# Patient Record
Sex: FEMALE | Race: ASIAN | NOT HISPANIC OR LATINO | Employment: FULL TIME | URBAN - METROPOLITAN AREA
[De-identification: names, ages, dates, MRNs, and addresses within clinical notes are randomized per-mention and may not be internally consistent; named-entity substitution may affect disease eponyms.]

---

## 2020-04-16 ENCOUNTER — APPOINTMENT (OUTPATIENT)
Dept: CARDIOLOGY | Facility: MEDICAL CENTER | Age: 45
DRG: 872 | End: 2020-04-16
Attending: HOSPITALIST
Payer: COMMERCIAL

## 2020-04-16 ENCOUNTER — APPOINTMENT (OUTPATIENT)
Dept: RADIOLOGY | Facility: MEDICAL CENTER | Age: 45
DRG: 872 | End: 2020-04-16
Attending: EMERGENCY MEDICINE
Payer: COMMERCIAL

## 2020-04-16 ENCOUNTER — HOSPITAL ENCOUNTER (INPATIENT)
Facility: MEDICAL CENTER | Age: 45
LOS: 3 days | DRG: 872 | End: 2020-04-20
Attending: EMERGENCY MEDICINE | Admitting: HOSPITALIST
Payer: COMMERCIAL

## 2020-04-16 DIAGNOSIS — L02.91 ABSCESS: ICD-10-CM

## 2020-04-16 DIAGNOSIS — I16.0 HYPERTENSIVE URGENCY: ICD-10-CM

## 2020-04-16 DIAGNOSIS — L73.2 HIDRADENITIS: ICD-10-CM

## 2020-04-16 DIAGNOSIS — Z20.822 SUSPECTED COVID-19 VIRUS INFECTION: ICD-10-CM

## 2020-04-16 PROBLEM — R09.89 ABNORMAL FINDING OF LUNG: Status: ACTIVE | Noted: 2020-04-16

## 2020-04-16 LAB
ALBUMIN SERPL BCP-MCNC: 4.5 G/DL (ref 3.2–4.9)
ALBUMIN/GLOB SERPL: 1.5 G/DL
ALP SERPL-CCNC: 83 U/L (ref 30–99)
ALT SERPL-CCNC: 13 U/L (ref 2–50)
ANION GAP SERPL CALC-SCNC: 12 MMOL/L (ref 7–16)
AST SERPL-CCNC: 12 U/L (ref 12–45)
BASOPHILS # BLD AUTO: 0.3 % (ref 0–1.8)
BASOPHILS # BLD: 0.04 K/UL (ref 0–0.12)
BILIRUB SERPL-MCNC: 0.5 MG/DL (ref 0.1–1.5)
BUN SERPL-MCNC: 13 MG/DL (ref 8–22)
CALCIUM SERPL-MCNC: 9.4 MG/DL (ref 8.5–10.5)
CHLORIDE SERPL-SCNC: 99 MMOL/L (ref 96–112)
CO2 SERPL-SCNC: 26 MMOL/L (ref 20–33)
COVID ORDER STATUS COVID19: NORMAL
CREAT SERPL-MCNC: 0.71 MG/DL (ref 0.5–1.4)
CRP SERPL HS-MCNC: 1 MG/DL (ref 0–0.75)
EOSINOPHIL # BLD AUTO: 0.22 K/UL (ref 0–0.51)
EOSINOPHIL NFR BLD: 1.5 % (ref 0–6.9)
ERYTHROCYTE [DISTWIDTH] IN BLOOD BY AUTOMATED COUNT: 38.6 FL (ref 35.9–50)
ERYTHROCYTE [SEDIMENTATION RATE] IN BLOOD BY WESTERGREN METHOD: 10 MM/HOUR (ref 0–20)
FERRITIN SERPL-MCNC: 97.9 NG/ML (ref 10–291)
FLUAV RNA SPEC QL NAA+PROBE: NEGATIVE
FLUBV RNA SPEC QL NAA+PROBE: NEGATIVE
GLOBULIN SER CALC-MCNC: 3.1 G/DL (ref 1.9–3.5)
GLUCOSE SERPL-MCNC: 112 MG/DL (ref 65–99)
GRAM STN SPEC: NORMAL
HCG SERPL QL: NEGATIVE
HCT VFR BLD AUTO: 45.6 % (ref 37–47)
HGB BLD-MCNC: 16 G/DL (ref 12–16)
IMM GRANULOCYTES # BLD AUTO: 0.06 K/UL (ref 0–0.11)
IMM GRANULOCYTES NFR BLD AUTO: 0.4 % (ref 0–0.9)
LACTATE BLD-SCNC: 2.2 MMOL/L (ref 0.5–2)
LACTATE BLD-SCNC: 2.2 MMOL/L (ref 0.5–2)
LACTATE BLD-SCNC: 3.1 MMOL/L (ref 0.5–2)
LDH SERPL L TO P-CCNC: 244 U/L (ref 107–266)
LV EJECT FRACT  99904: 60
LV EJECT FRACT MOD 2C 99903: 53.65
LV EJECT FRACT MOD 4C 99902: 52.16
LV EJECT FRACT MOD BP 99901: 51.69
LYMPHOCYTES # BLD AUTO: 2.14 K/UL (ref 1–4.8)
LYMPHOCYTES NFR BLD: 14.6 % (ref 22–41)
MAGNESIUM SERPL-MCNC: 1.8 MG/DL (ref 1.5–2.5)
MCH RBC QN AUTO: 29.2 PG (ref 27–33)
MCHC RBC AUTO-ENTMCNC: 35.1 G/DL (ref 33.6–35)
MCV RBC AUTO: 83.2 FL (ref 81.4–97.8)
MONOCYTES # BLD AUTO: 0.81 K/UL (ref 0–0.85)
MONOCYTES NFR BLD AUTO: 5.5 % (ref 0–13.4)
NEUTROPHILS # BLD AUTO: 11.39 K/UL (ref 2–7.15)
NEUTROPHILS NFR BLD: 77.7 % (ref 44–72)
NRBC # BLD AUTO: 0 K/UL
NRBC BLD-RTO: 0 /100 WBC
PHOSPHATE SERPL-MCNC: 2.1 MG/DL (ref 2.5–4.5)
PLATELET # BLD AUTO: 262 K/UL (ref 164–446)
PMV BLD AUTO: 9.9 FL (ref 9–12.9)
POTASSIUM SERPL-SCNC: 3.3 MMOL/L (ref 3.6–5.5)
PROCALCITONIN SERPL-MCNC: <0.05 NG/ML
PROT SERPL-MCNC: 7.6 G/DL (ref 6–8.2)
RBC # BLD AUTO: 5.48 M/UL (ref 4.2–5.4)
SARS-COV-2 RNA RESP QL NAA+PROBE: NEGATIVE
SIGNIFICANT IND 70042: NORMAL
SITE SITE: NORMAL
SODIUM SERPL-SCNC: 137 MMOL/L (ref 135–145)
SOURCE SOURCE: NORMAL
SPECIMEN SOURCE: NORMAL
TROPONIN T SERPL-MCNC: <6 NG/L (ref 6–19)
WBC # BLD AUTO: 14.7 K/UL (ref 4.8–10.8)

## 2020-04-16 PROCEDURE — 86140 C-REACTIVE PROTEIN: CPT

## 2020-04-16 PROCEDURE — 93306 TTE W/DOPPLER COMPLETE: CPT | Mod: 26 | Performed by: INTERNAL MEDICINE

## 2020-04-16 PROCEDURE — 87070 CULTURE OTHR SPECIMN AEROBIC: CPT

## 2020-04-16 PROCEDURE — 700105 HCHG RX REV CODE 258: Performed by: HOSPITALIST

## 2020-04-16 PROCEDURE — G0378 HOSPITAL OBSERVATION PER HR: HCPCS

## 2020-04-16 PROCEDURE — 93306 TTE W/DOPPLER COMPLETE: CPT

## 2020-04-16 PROCEDURE — 83615 LACTATE (LD) (LDH) ENZYME: CPT

## 2020-04-16 PROCEDURE — 700101 HCHG RX REV CODE 250: Performed by: EMERGENCY MEDICINE

## 2020-04-16 PROCEDURE — 87040 BLOOD CULTURE FOR BACTERIA: CPT

## 2020-04-16 PROCEDURE — 84484 ASSAY OF TROPONIN QUANT: CPT

## 2020-04-16 PROCEDURE — 82728 ASSAY OF FERRITIN: CPT

## 2020-04-16 PROCEDURE — 71045 X-RAY EXAM CHEST 1 VIEW: CPT

## 2020-04-16 PROCEDURE — 80053 COMPREHEN METABOLIC PANEL: CPT

## 2020-04-16 PROCEDURE — 700101 HCHG RX REV CODE 250

## 2020-04-16 PROCEDURE — 700102 HCHG RX REV CODE 250 W/ 637 OVERRIDE(OP): Performed by: HOSPITALIST

## 2020-04-16 PROCEDURE — A9270 NON-COVERED ITEM OR SERVICE: HCPCS | Performed by: HOSPITALIST

## 2020-04-16 PROCEDURE — 84145 PROCALCITONIN (PCT): CPT

## 2020-04-16 PROCEDURE — 83735 ASSAY OF MAGNESIUM: CPT

## 2020-04-16 PROCEDURE — 83605 ASSAY OF LACTIC ACID: CPT | Mod: 91

## 2020-04-16 PROCEDURE — 96375 TX/PRO/DX INJ NEW DRUG ADDON: CPT

## 2020-04-16 PROCEDURE — 96366 THER/PROPH/DIAG IV INF ADDON: CPT

## 2020-04-16 PROCEDURE — 99220 PR INITIAL OBSERVATION CARE,LEVL III: CPT | Performed by: HOSPITALIST

## 2020-04-16 PROCEDURE — 84100 ASSAY OF PHOSPHORUS: CPT

## 2020-04-16 PROCEDURE — 84703 CHORIONIC GONADOTROPIN ASSAY: CPT

## 2020-04-16 PROCEDURE — 700111 HCHG RX REV CODE 636 W/ 250 OVERRIDE (IP): Performed by: HOSPITALIST

## 2020-04-16 PROCEDURE — U0004 COV-19 TEST NON-CDC HGH THRU: HCPCS

## 2020-04-16 PROCEDURE — 303977 HCHG I & D

## 2020-04-16 PROCEDURE — G2023 SPECIMEN COLLECT COVID-19: HCPCS | Performed by: EMERGENCY MEDICINE

## 2020-04-16 PROCEDURE — 99285 EMERGENCY DEPT VISIT HI MDM: CPT

## 2020-04-16 PROCEDURE — 85025 COMPLETE CBC W/AUTO DIFF WBC: CPT

## 2020-04-16 PROCEDURE — 87502 INFLUENZA DNA AMP PROBE: CPT

## 2020-04-16 PROCEDURE — 0X940ZZ DRAINAGE OF RIGHT AXILLA, OPEN APPROACH: ICD-10-PCS | Performed by: EMERGENCY MEDICINE

## 2020-04-16 PROCEDURE — 96376 TX/PRO/DX INJ SAME DRUG ADON: CPT

## 2020-04-16 PROCEDURE — 96365 THER/PROPH/DIAG IV INF INIT: CPT

## 2020-04-16 PROCEDURE — 85652 RBC SED RATE AUTOMATED: CPT

## 2020-04-16 PROCEDURE — 700101 HCHG RX REV CODE 250: Performed by: HOSPITALIST

## 2020-04-16 PROCEDURE — 36415 COLL VENOUS BLD VENIPUNCTURE: CPT

## 2020-04-16 PROCEDURE — 87205 SMEAR GRAM STAIN: CPT

## 2020-04-16 PROCEDURE — 96367 TX/PROPH/DG ADDL SEQ IV INF: CPT

## 2020-04-16 PROCEDURE — 93005 ELECTROCARDIOGRAM TRACING: CPT | Performed by: EMERGENCY MEDICINE

## 2020-04-16 RX ORDER — PROCHLORPERAZINE EDISYLATE 5 MG/ML
5-10 INJECTION INTRAMUSCULAR; INTRAVENOUS EVERY 4 HOURS PRN
Status: DISCONTINUED | OUTPATIENT
Start: 2020-04-16 | End: 2020-04-19

## 2020-04-16 RX ORDER — DOXYCYCLINE 100 MG/1
100 TABLET ORAL EVERY 12 HOURS
Status: DISCONTINUED | OUTPATIENT
Start: 2020-04-16 | End: 2020-04-20 | Stop reason: HOSPADM

## 2020-04-16 RX ORDER — ENALAPRILAT 1.25 MG/ML
1.25 INJECTION INTRAVENOUS EVERY 6 HOURS PRN
Status: DISCONTINUED | OUTPATIENT
Start: 2020-04-16 | End: 2020-04-20 | Stop reason: HOSPADM

## 2020-04-16 RX ORDER — LIDOCAINE HYDROCHLORIDE AND EPINEPHRINE 10; 10 MG/ML; UG/ML
20 INJECTION, SOLUTION INFILTRATION; PERINEURAL ONCE
Status: COMPLETED | OUTPATIENT
Start: 2020-04-16 | End: 2020-04-16

## 2020-04-16 RX ORDER — AMOXICILLIN 250 MG
2 CAPSULE ORAL 2 TIMES DAILY
Status: DISCONTINUED | OUTPATIENT
Start: 2020-04-16 | End: 2020-04-19

## 2020-04-16 RX ORDER — BISACODYL 10 MG
10 SUPPOSITORY, RECTAL RECTAL
Status: DISCONTINUED | OUTPATIENT
Start: 2020-04-16 | End: 2020-04-19

## 2020-04-16 RX ORDER — PROMETHAZINE HYDROCHLORIDE 25 MG/1
12.5-25 SUPPOSITORY RECTAL EVERY 4 HOURS PRN
Status: DISCONTINUED | OUTPATIENT
Start: 2020-04-16 | End: 2020-04-19

## 2020-04-16 RX ORDER — POLYETHYLENE GLYCOL 3350 17 G/17G
1 POWDER, FOR SOLUTION ORAL
Status: DISCONTINUED | OUTPATIENT
Start: 2020-04-16 | End: 2020-04-19

## 2020-04-16 RX ORDER — FAMOTIDINE 20 MG/1
40 TABLET, FILM COATED ORAL DAILY
Status: DISCONTINUED | OUTPATIENT
Start: 2020-04-16 | End: 2020-04-19

## 2020-04-16 RX ORDER — AMLODIPINE BESYLATE 10 MG/1
10 TABLET ORAL
Status: DISCONTINUED | OUTPATIENT
Start: 2020-04-16 | End: 2020-04-20 | Stop reason: HOSPADM

## 2020-04-16 RX ORDER — HYDRALAZINE HYDROCHLORIDE 20 MG/ML
20 INJECTION INTRAMUSCULAR; INTRAVENOUS EVERY 6 HOURS PRN
Status: DISCONTINUED | OUTPATIENT
Start: 2020-04-16 | End: 2020-04-20 | Stop reason: HOSPADM

## 2020-04-16 RX ORDER — LABETALOL HYDROCHLORIDE 5 MG/ML
10 INJECTION, SOLUTION INTRAVENOUS ONCE
Status: COMPLETED | OUTPATIENT
Start: 2020-04-16 | End: 2020-04-16

## 2020-04-16 RX ORDER — LABETALOL HYDROCHLORIDE 5 MG/ML
10-20 INJECTION, SOLUTION INTRAVENOUS EVERY 4 HOURS PRN
Status: DISCONTINUED | OUTPATIENT
Start: 2020-04-16 | End: 2020-04-20 | Stop reason: HOSPADM

## 2020-04-16 RX ORDER — ONDANSETRON 2 MG/ML
4 INJECTION INTRAMUSCULAR; INTRAVENOUS ONCE
Status: DISPENSED | OUTPATIENT
Start: 2020-04-16 | End: 2020-04-17

## 2020-04-16 RX ORDER — MORPHINE SULFATE 4 MG/ML
4 INJECTION, SOLUTION INTRAMUSCULAR; INTRAVENOUS ONCE
Status: DISPENSED | OUTPATIENT
Start: 2020-04-16 | End: 2020-04-17

## 2020-04-16 RX ORDER — LIDOCAINE HYDROCHLORIDE AND EPINEPHRINE 10; 10 MG/ML; UG/ML
INJECTION, SOLUTION INFILTRATION; PERINEURAL
Status: COMPLETED
Start: 2020-04-16 | End: 2020-04-16

## 2020-04-16 RX ORDER — AMOXICILLIN AND CLAVULANATE POTASSIUM 500; 125 MG/1; MG/1
1 TABLET, FILM COATED ORAL EVERY 8 HOURS
Status: DISCONTINUED | OUTPATIENT
Start: 2020-04-16 | End: 2020-04-17

## 2020-04-16 RX ORDER — TRAMADOL HYDROCHLORIDE 50 MG/1
50 TABLET ORAL EVERY 6 HOURS PRN
Status: DISCONTINUED | OUTPATIENT
Start: 2020-04-16 | End: 2020-04-20 | Stop reason: HOSPADM

## 2020-04-16 RX ORDER — CLINDAMYCIN PHOSPHATE 600 MG/50ML
600 INJECTION, SOLUTION INTRAVENOUS ONCE
Status: COMPLETED | OUTPATIENT
Start: 2020-04-16 | End: 2020-04-16

## 2020-04-16 RX ORDER — ONDANSETRON 4 MG/1
4 TABLET, ORALLY DISINTEGRATING ORAL EVERY 4 HOURS PRN
Status: DISCONTINUED | OUTPATIENT
Start: 2020-04-16 | End: 2020-04-20 | Stop reason: HOSPADM

## 2020-04-16 RX ORDER — ONDANSETRON 2 MG/ML
4 INJECTION INTRAMUSCULAR; INTRAVENOUS EVERY 4 HOURS PRN
Status: DISCONTINUED | OUTPATIENT
Start: 2020-04-16 | End: 2020-04-20 | Stop reason: HOSPADM

## 2020-04-16 RX ORDER — PROMETHAZINE HYDROCHLORIDE 25 MG/1
12.5-25 TABLET ORAL EVERY 4 HOURS PRN
Status: DISCONTINUED | OUTPATIENT
Start: 2020-04-16 | End: 2020-04-19

## 2020-04-16 RX ORDER — POTASSIUM CHLORIDE 20 MEQ/1
40 TABLET, EXTENDED RELEASE ORAL ONCE
Status: COMPLETED | OUTPATIENT
Start: 2020-04-16 | End: 2020-04-16

## 2020-04-16 RX ORDER — GUAIFENESIN/DEXTROMETHORPHAN 100-10MG/5
10 SYRUP ORAL EVERY 6 HOURS PRN
Status: DISCONTINUED | OUTPATIENT
Start: 2020-04-16 | End: 2020-04-20 | Stop reason: HOSPADM

## 2020-04-16 RX ADMIN — ENALAPRILAT 1.25 MG: 1.25 INJECTION INTRAVENOUS at 20:46

## 2020-04-16 RX ADMIN — AMLODIPINE BESYLATE 10 MG: 10 TABLET ORAL at 18:07

## 2020-04-16 RX ADMIN — LIDOCAINE HYDROCHLORIDE AND EPINEPHRINE 20 ML: 10; 10 INJECTION, SOLUTION INFILTRATION; PERINEURAL at 13:15

## 2020-04-16 RX ADMIN — HYDRALAZINE HYDROCHLORIDE 20 MG: 20 INJECTION INTRAMUSCULAR; INTRAVENOUS at 21:38

## 2020-04-16 RX ADMIN — LIDOCAINE HYDROCHLORIDE,EPINEPHRINE BITARTRATE 20 ML: 10; .01 INJECTION, SOLUTION INFILTRATION; PERINEURAL at 13:15

## 2020-04-16 RX ADMIN — FAMOTIDINE 40 MG: 20 TABLET ORAL at 18:07

## 2020-04-16 RX ADMIN — AMOXICILLIN AND CLAVULANATE POTASSIUM 1 TABLET: 500; 125 TABLET, FILM COATED ORAL at 18:28

## 2020-04-16 RX ADMIN — LABETALOL HYDROCHLORIDE 20 MG: 5 INJECTION, SOLUTION INTRAVENOUS at 19:08

## 2020-04-16 RX ADMIN — POTASSIUM PHOSPHATE, MONOBASIC AND POTASSIUM PHOSPHATE, DIBASIC 30 MMOL: 224; 236 INJECTION, SOLUTION, CONCENTRATE INTRAVENOUS at 18:06

## 2020-04-16 RX ADMIN — DOXYCYCLINE 100 MG: 100 TABLET ORAL at 18:07

## 2020-04-16 RX ADMIN — CLINDAMYCIN IN 5 PERCENT DEXTROSE 600 MG: 12 INJECTION, SOLUTION INTRAVENOUS at 14:15

## 2020-04-16 RX ADMIN — LABETALOL HYDROCHLORIDE 10 MG: 5 INJECTION, SOLUTION INTRAVENOUS at 14:14

## 2020-04-16 RX ADMIN — POTASSIUM CHLORIDE 40 MEQ: 1500 TABLET, EXTENDED RELEASE ORAL at 18:28

## 2020-04-16 ASSESSMENT — ENCOUNTER SYMPTOMS
WEAKNESS: 0
SPUTUM PRODUCTION: 0
FEVER: 0
BACK PAIN: 0
DIARRHEA: 0
CLAUDICATION: 0
MYALGIAS: 0
HEMOPTYSIS: 0
SINUS PAIN: 0
NAUSEA: 0
FALLS: 0
DIZZINESS: 0
POLYDIPSIA: 0
DOUBLE VISION: 0
FLANK PAIN: 0
EYE PAIN: 0
NECK PAIN: 0
BLOOD IN STOOL: 0
DIAPHORESIS: 0
VOMITING: 0
SORE THROAT: 0
HALLUCINATIONS: 0
TREMORS: 0
ABDOMINAL PAIN: 0
ORTHOPNEA: 0
CONSTIPATION: 0
STRIDOR: 0
PALPITATIONS: 0
WHEEZING: 0
PND: 0
BRUISES/BLEEDS EASILY: 0
SHORTNESS OF BREATH: 0
BLURRED VISION: 0
CHILLS: 0
HEADACHES: 0
TINGLING: 0
COUGH: 0
DEPRESSION: 0
PHOTOPHOBIA: 0
HEARTBURN: 0

## 2020-04-16 ASSESSMENT — LIFESTYLE VARIABLES
AVERAGE NUMBER OF DAYS PER WEEK YOU HAVE A DRINK CONTAINING ALCOHOL: 0
DO YOU DRINK ALCOHOL: NO
EVER HAD A DRINK FIRST THING IN THE MORNING TO STEADY YOUR NERVES TO GET RID OF A HANGOVER: NO
HOW MANY TIMES IN THE PAST YEAR HAVE YOU HAD 5 OR MORE DRINKS IN A DAY: 0
TOTAL SCORE: 0
CONSUMPTION TOTAL: NEGATIVE
HAVE PEOPLE ANNOYED YOU BY CRITICIZING YOUR DRINKING: NO
HAVE YOU EVER FELT YOU SHOULD CUT DOWN ON YOUR DRINKING: NO
SUBSTANCE_ABUSE: 0
EVER FELT BAD OR GUILTY ABOUT YOUR DRINKING: NO
TOTAL SCORE: 0
TOTAL SCORE: 0
ON A TYPICAL DAY WHEN YOU DRINK ALCOHOL HOW MANY DRINKS DO YOU HAVE: 0

## 2020-04-16 ASSESSMENT — FIBROSIS 4 INDEX: FIB4 SCORE: 0.53

## 2020-04-16 NOTE — ED NOTES
Patient ambulates back to ED room 30 with steady, upright gait without difficulty. Patient provided with hospital gown to change at this time. Verbalizes understanding.

## 2020-04-16 NOTE — ED TRIAGE NOTES
Chief Complaint   Patient presents with   • Lump     R arm pit for 2 years, painful now     Pt hypertensive in triage.

## 2020-04-16 NOTE — ED NOTES
Patient is resting comfortably. Patient placed on droplet & contact precautions, eye protection. Patient positions self on cart, AAOX4, denies pain or complaints. Updated on POC & verbalizes understanding. Will continue to monitor, call light within reach. RN dons appropriate PPE including N95, eye protection, gown, gloves upon entering patient room for care. I&D completed by ERP at bedside previously, patient tolerates well. Packing placed to right armpit wound, dressing placed.

## 2020-04-16 NOTE — ED PROVIDER NOTES
ED Provider Note    Scribed for Hugo Jo M.D. by Alyse Mccurdy. 4/16/2020, 1:03 PM.    Primary care provider: No primary care provider on file.  Means of arrival: Walk-In  History obtained from: Patient  History limited by: None     CHIEF COMPLAINT  Chief Complaint   Patient presents with   • Lump     R arm pit for 2 years, painful now       HPI  Kacey No is a 44 y.o. female who presents to the Emergency Department for right arm pit lump pain onset 1 week ago. The patient states that she had an operation in 2014 to have the cyst in her armpit removed, but it grew back and has grown progressively since 2018. She reports that she developed pain from the lump that worsened throughout the week, and was prompted to come into the [facility] ED for further evaluation this afternoon. No alleviating or exacerbating factors were noted. Patient has associated right arm pit discomfort, but denies fever, chills, shortness of breath, vomiting, or diarrhea. Patient also denies having any recent travels outside of the country. She has no known allergies, medical problems, or daily medications that she takes.     REVIEW OF SYSTEMS  Pertinent positives include right arm pit lump pain and right armpit discomfort. Pertinent negatives include no fever, chills, shortness of breath, vomiting, or diarrhea. All other systems reviewed and negative.    PAST MEDICAL HISTORY   None noted     SURGICAL HISTORY  patient denies any surgical history    SOCIAL HISTORY  Social History     Tobacco Use   • Smoking status: None   Substance Use Topics   • Alcohol use: None   • Drug use: None      Social History     Substance and Sexual Activity   Drug Use None       FAMILY HISTORY  No family history on file.    CURRENT MEDICATIONS  Home Medications    **Home medications have not yet been reviewed for this encounter**         ALLERGIES  No Known Allergies    PHYSICAL EXAM  VITAL SIGNS: BP (!) 247/146   Pulse (!) 106   Temp 36.3 °C (97.3 °F)  "(Temporal)   Resp 12   Ht 1.6 m (5' 3\")   Wt 72.6 kg (160 lb)   SpO2 97%   BMI 28.34 kg/m²     Constitutional: Well developed, Well nourished, No acute distress, Non-toxic appearance.   HENT: Normocephalic, Atraumatic, TMs normal, mucous membranes moist, no erythema, exudates, swelling, or masses, nares patent  Eyes: nonicteric  Neck: Supple, no meningismus  Lymphatic: No lymphadenopathy noted.   Cardiovascular: Regular rate and rhythm, no gallops rubs or murmurs  Lungs: Clear bilaterally   Abdomen: Bowel sounds normal, Soft, No tenderness  Skin: Rounded mass in the left arm pit that has mild tenderness and no erythema, Warm, Dry, no rash  Back: No tenderness, No CVA tenderness.   Genitalia: Deferred  Rectal: Deferred  Extremities: No edema  Neurologic: Alert, appropriate, follows commands, moving all extremities, normal speech   Psychiatric: Affect normal    DIAGNOSTIC STUDIES / PROCEDURES    LABS  Results for orders placed or performed during the hospital encounter of 04/16/20   CBC WITH DIFFERENTIAL   Result Value Ref Range    WBC 14.7 (H) 4.8 - 10.8 K/uL    RBC 5.48 (H) 4.20 - 5.40 M/uL    Hemoglobin 16.0 12.0 - 16.0 g/dL    Hematocrit 45.6 37.0 - 47.0 %    MCV 83.2 81.4 - 97.8 fL    MCH 29.2 27.0 - 33.0 pg    MCHC 35.1 (H) 33.6 - 35.0 g/dL    RDW 38.6 35.9 - 50.0 fL    Platelet Count 262 164 - 446 K/uL    MPV 9.9 9.0 - 12.9 fL    Neutrophils-Polys 77.70 (H) 44.00 - 72.00 %    Lymphocytes 14.60 (L) 22.00 - 41.00 %    Monocytes 5.50 0.00 - 13.40 %    Eosinophils 1.50 0.00 - 6.90 %    Basophils 0.30 0.00 - 1.80 %    Immature Granulocytes 0.40 0.00 - 0.90 %    Nucleated RBC 0.00 /100 WBC    Neutrophils (Absolute) 11.39 (H) 2.00 - 7.15 K/uL    Lymphs (Absolute) 2.14 1.00 - 4.80 K/uL    Monos (Absolute) 0.81 0.00 - 0.85 K/uL    Eos (Absolute) 0.22 0.00 - 0.51 K/uL    Baso (Absolute) 0.04 0.00 - 0.12 K/uL    Immature Granulocytes (abs) 0.06 0.00 - 0.11 K/uL    NRBC (Absolute) 0.00 K/uL   COMP METABOLIC PANEL "   Result Value Ref Range    Sodium 137 135 - 145 mmol/L    Potassium 3.3 (L) 3.6 - 5.5 mmol/L    Chloride 99 96 - 112 mmol/L    Co2 26 20 - 33 mmol/L    Anion Gap 12.0 7.0 - 16.0    Glucose 112 (H) 65 - 99 mg/dL    Bun 13 8 - 22 mg/dL    Creatinine 0.71 0.50 - 1.40 mg/dL    Calcium 9.4 8.5 - 10.5 mg/dL    AST(SGOT) 12 12 - 45 U/L    ALT(SGPT) 13 2 - 50 U/L    Alkaline Phosphatase 83 30 - 99 U/L    Total Bilirubin 0.5 0.1 - 1.5 mg/dL    Albumin 4.5 3.2 - 4.9 g/dL    Total Protein 7.6 6.0 - 8.2 g/dL    Globulin 3.1 1.9 - 3.5 g/dL    A-G Ratio 1.5 g/dL   TROPONIN   Result Value Ref Range    Troponin T <6 6 - 19 ng/L   HCG QUAL SERUM   Result Value Ref Range    Beta-Hcg Qualitative Serum Negative Negative   Magnesium   Result Value Ref Range    Magnesium 1.8 1.5 - 2.5 mg/dL   Phosphorus   Result Value Ref Range    Phosphorus 2.1 (L) 2.5 - 4.5 mg/dL   Ferritin   Result Value Ref Range    Ferritin 97.9 10.0 - 291.0 ng/mL   Lactic Acid   Result Value Ref Range    Lactic Acid 2.2 (H) 0.5 - 2.0 mmol/L   Lactic Acid   Result Value Ref Range    Lactic Acid 2.2 (H) 0.5 - 2.0 mmol/L   Procalcitonin   Result Value Ref Range    Procalcitonin <0.05 <0.25 ng/mL   LDH   Result Value Ref Range    LDH Total 244 107 - 266 U/L   CRP Quantitative (Non-Cardiac)   Result Value Ref Range    Stat C-Reactive Protein 1.00 (H) 0.00 - 0.75 mg/dL   Sed Rate   Result Value Ref Range    Sed Rate Westergren 10 0 - 20 mm/hour   COVID/SARS CoV-2   Result Value Ref Range    COVID Order Status Received    ESTIMATED GFR   Result Value Ref Range    GFR If African American >60 >60 mL/min/1.73 m 2    GFR If Non African American >60 >60 mL/min/1.73 m 2   GRAM STAIN   Result Value Ref Range    Significant Indicator .     Source WND     Site ABSCESS     Gram Stain Result Many WBCs.  Many Gram positive cocci.      SARS-CoV-2, PCR (In-House)   Result Value Ref Range    SARS-CoV-2 Source NP Swab    EKG (NOW)   Result Value Ref Range    Report       [facility]  [facility] Emergency Dept.    Test Date:  2020  Pt Name:    VAMSI TREVIÑO                Department: ER  MRN:        [id number]                      Room:        30  Gender:     Female                       Technician: [id number]  :        [id number]                   Requested By:LIT LEONARD  Order #:    [id number]                    Reading MD:    Measurements  Intervals                                Axis  Rate:       94                           P:          44  VA:         184                          QRS:        23  QRSD:       72                           T:          13  QT:         384  QTc:        481    Interpretive Statements  SINUS RHYTHM  CONSIDER LEFT ATRIAL ABNORMALITY  BORDERLINE T ABNORMALITIES, INFERIOR LEADS  No previous ECG available for comparison       All labs reviewed by me.     RADIOLOGY  DX-CHEST-PORTABLE (1 VIEW)   Final Result      1.  Patchy bilateral interstitial and airspace opacities are suggestive of atypical/viral infection.      2.  Prominent soft tissue projecting over the right axilla may represent breast tissue.      3.  Faint radiopaque density projecting over the lateral right axilla may be external to the patient or represent faint calcification.      EC-ECHOCARDIOGRAM COMPLETE W/ CONT    (Results Pending)     The radiologist's interpretation of all radiological studies have been reviewed by me.     EKG Interpretation:  Interpreted by me    Rhythm:  Normal sinus rhythm   Rate: 94  Axis: normal  Ectopy: none  Conduction: normal  Sinus Rhythm: ST depression laterally, No ST elevation inferiorly   ST Segments: no acute change  T Waves: no acute change  Q Waves: none  Clinical Impression: Normal EKG without acute changes       Incision and Drainage Procedure Note    Indication: Right armpit lump     Procedure: The patient was positioned appropriately and the skin over the incision site was prepped with betadine and draped in a sterile fashion. Local anesthesia was obtained by  infiltration using 1% Lidocaine with epinephrine and an 18 gauge needle.  An incision was then made over the apex of the lesion and approximately 4 cc of purulent material was expressed. Loculations were broken up using forceps and more of the material was able to be expressed. The drainage cavity was then packed with 1/4 inch sterile gauze. The patient’s tetanus status was up to date and did not require a booster dose.    The patient tolerated the procedure well.    Complications: None      COURSE & MEDICAL DECISION MAKING  Nursing notes, VS, PMSFHx reviewed in chart.     1:03 PM Patient seen and examined at bedside. I verified that the patient was wearing a mask and I was wearing appropriate PPE every time I entered the room. The patient's mask was on the patient at all times during my encounter. Discussed plan of care with patient. I informed them that medication will be ordered to treat hersymptoms. The patient is understanding and agreeable with plan of care. Patient was treated with 1% Lidocaine-Epinephrine Inj Soln for her symptoms.     1:17 PM Incision and drainage procedure performed as above.     1:30 PM Ordered DX-Chest-Portable (1 View), HCG Qual Serum, Culture Wound w/ Gram Stain, CBC w/ Differential, CMP, and Troponin. Patient will be treated 4 mg Morphine, 4 mg Zofran, 10 mg Labetalol, and 600 mg Cleocin.    3:16 PM Paged Hospitalist    3:18 PM Patient was reevaluated at bedside. Her blood pressure is currently 174/111. Discussed lab and radiology results with the patient and informed them that there were abnormal findings as noted above. The plan of care was discussed with the patient. She is understanding and agreeable to the plan of care. Patient had the opportunity to ask any questions. The plan for hospitalization was discussed with the patient due to hypertension and the patient most likely having COIVD-19. Patient is understanding and agreeable to the plan for hospitalization.     3:23 PM I  discussed the patient's case and the above findings with Dr. Rogers (Hospitalist) who agrees to hospitalize the patient due to her current condition.     Decision Making:  This is a 44 y.o. year old female who presents with complaint of a lump in her right armpit that she has had previously but that has increased in size recently.  She stated it was a cyst that was excised previously by a surgeon.  The area was visualized with ultrasound and found to have fluid inside-it was aspirated and that was found to be purulent.  It appears to be a loculated abscess.  It was drained here and packed.  Additionally, the patient's blood pressure is markedly elevated around 240/140 on arrival.  She was written for labetalol and pain control and her blood pressure has come down.  Chest x-ray here demonstrates what may be COVID infection with diffuse infiltrates.  We are pending COVID testing.  The patient will be admitted for control of her blood pressure treat with antibiotics and follow-up on culture testing.  At this point there is no evidence of acute coronary syndrome.    DISPOSITION:  Patient will be hospitalized by Dr. Rogers in guarded condition.      FINAL IMPRESSION  1. Abscess    2. Hypertensive urgency    3. Suspected COVID-19 virus infection          Alyse CONNOR (Melanie), am scribing for, and in the presence of, Hugo Jo M.D..    Electronically signed by: Alyse Mccurdy (Melanie), 4/16/2020    Hugo CONNOR M.D. personally performed the services described in this documentation, as scribed by Alyse Mccurdy in my presence, and it is both accurate and complete. C    The note accurately reflects work and decisions made by me.  Hugo Jo M.D.  4/16/2020  4:25 PM

## 2020-04-16 NOTE — ASSESSMENT & PLAN NOTE
Repeated episodes of right axillary swelling  I&D performed in ER by ER physician  Which are growing Peptostreptococcus  Subsequent I&D by Dr. Wilkerson  De-escalate antibiotics to oral Augmentin or doxycycline  Awaiting wound team input, arrange outpatient wound care  Close clinical monitoring

## 2020-04-16 NOTE — H&P
"Hospital Medicine History & Physical Note    Date of Service  4/16/2020    Primary Care Physician  No primary care provider on file.    Consultants  None    Code Status  Full    Chief Complaint  Right axillary swelling    History of Presenting Illness  44 y.o. female who presented 4/16/2020 with past medical history of hypertension comes into the emergency room with right axillary swelling.  Patient states that the swelling started 2 years ago when she was in [facility].  It has had repeated infections and the doctors over there is to called it \"scar tissue\".  Patient noticed for the past week the swelling has been growing, increasing erythematous, painful and has drainage.  The patient denies any other symptoms such as fever, nausea, vomiting, chills, difficulty urinating, cough, chest pain, shortness of breath, nasal congestion, sore throat.  Patient came from the [facility] 6 months ago and has not taken her blood pressure medication since.  She does not remember which blood pressure medication she was prescribed.  Patient presented to the hospital hypertensive and tachycardic.  Her blood pressure was greater than 200 upon arrival.  EKG interpreted by me found normal sinus rhythm and [facility]  Chest x-ray interpreted by me found right-sided infiltrate  Review of Systems  Review of Systems   Constitutional: Negative for chills, diaphoresis, fever and malaise/fatigue.   HENT: Negative for congestion, ear discharge, ear pain, hearing loss, nosebleeds, sinus pain, sore throat and tinnitus.    Eyes: Negative for blurred vision, double vision, photophobia and pain.   Respiratory: Negative for cough, hemoptysis, sputum production, shortness of breath, wheezing and stridor.    Cardiovascular: Negative for chest pain, palpitations, orthopnea, claudication, leg swelling and PND.   Gastrointestinal: Negative for abdominal pain, blood in stool, constipation, diarrhea, heartburn, melena, nausea and vomiting.   Genitourinary: " Negative for dysuria, flank pain, frequency, hematuria and urgency.   Musculoskeletal: Negative for back pain, falls, joint pain, myalgias and neck pain.   Skin: Negative for itching and rash.   Neurological: Negative for dizziness, tingling, tremors, weakness and headaches.   Endo/Heme/Allergies: Negative for environmental allergies and polydipsia. Does not bruise/bleed easily.   Psychiatric/Behavioral: Negative for depression, hallucinations, substance abuse and suicidal ideas.       Past Medical History  History of hypertension, history obesity    Surgical History  Surgical history is reviewed and not pertinent    Family History  Family history reviewed not pertinent    Social History   Patient does not use any illicit drugs, smokes cigarettes or drinks alcohol     Allergies  No Known Allergies    Medications  None       Physical Exam  Temp:  [36.3 °C (97.3 °F)-36.7 °C (98.1 °F)] 36.7 °C (98.1 °F)  Pulse:  [] 90  Resp:  [12-39] 16  BP: (151-247)/() 159/115  SpO2:  [94 %-98 %] 94 %    Physical Exam  Vitals signs and nursing note reviewed.   Constitutional:       General: She is not in acute distress.     Appearance: Normal appearance. She is not ill-appearing, toxic-appearing or diaphoretic.   HENT:      Head: Normocephalic and atraumatic.      Nose: No congestion or rhinorrhea.      Mouth/Throat:      Pharynx: No oropharyngeal exudate or posterior oropharyngeal erythema.   Eyes:      General: No scleral icterus.  Neck:      Musculoskeletal: No neck rigidity or muscular tenderness.      Vascular: No carotid bruit.   Cardiovascular:      Rate and Rhythm: Normal rate and regular rhythm.      Pulses: Normal pulses.      Heart sounds: Normal heart sounds. No murmur. No friction rub. No gallop.    Pulmonary:      Effort: Pulmonary effort is normal. No respiratory distress.      Breath sounds: Normal breath sounds. No stridor. No wheezing or rhonchi.   Abdominal:      General: Abdomen is flat. There is no  distension.      Palpations: There is no mass.      Tenderness: There is no abdominal tenderness. There is no left CVA tenderness, guarding or rebound.      Hernia: No hernia is present.   Musculoskeletal: Normal range of motion.         General: No swelling.      Right lower leg: No edema.      Left lower leg: No edema.   Lymphadenopathy:      Cervical: No cervical adenopathy.   Skin:     General: Skin is warm and dry.      Capillary Refill: Capillary refill takes more than 3 seconds.      Coloration: Skin is not jaundiced or pale.      Findings: No bruising or erythema.      Comments: Right axillary swelling 3 x 3 cm with active purulent drainage   Neurological:      Mental Status: She is alert.         Laboratory:  Recent Labs     04/16/20  1349   WBC 14.7*   RBC 5.48*   HEMOGLOBIN 16.0   HEMATOCRIT 45.6   MCV 83.2   MCH 29.2   MCHC 35.1*   RDW 38.6   PLATELETCT 262   MPV 9.9     Recent Labs     04/16/20  1349   SODIUM 137   POTASSIUM 3.3*   CHLORIDE 99   CO2 26   GLUCOSE 112*   BUN 13   CREATININE 0.71   CALCIUM 9.4     Recent Labs     04/16/20  1349   ALTSGPT 13   ASTSGOT 12   ALKPHOSPHAT 83   TBILIRUBIN 0.5   GLUCOSE 112*         No results for input(s): NTPROBNP in the last 72 hours.      Recent Labs     04/16/20  1349   TROPONINT <6       Urinalysis:    No results found     Imaging:  EC-ECHOCARDIOGRAM COMPLETE W/O CONT   Final Result      DX-CHEST-PORTABLE (1 VIEW)   Final Result      1.  Patchy bilateral interstitial and airspace opacities are suggestive of atypical/viral infection.      2.  Prominent soft tissue projecting over the right axilla may represent breast tissue.      3.  Faint radiopaque density projecting over the lateral right axilla may be external to the patient or represent faint calcification.            Assessment/Plan:  I anticipate this patient is appropriate for observation status at this time.    * Hidradenitis  Assessment & Plan  Repeated episodes of right axillary swelling  I&D  performed in ER by ER physician  Started on doxycycline and Augmentin  Check hemoglobin A1c and encourage weight loss  Once acute episode has resolved I suggest getting intralesional corticosteroid injections  Patient should follow-up with outpatient surgery or dermatology    Abnormal finding of lung  Assessment & Plan  Patient has right-sided lower lobe infiltrates that may be groundglass appearance.  ER physician was concerned for COVID which has been sent in the ER.  Although, I have low suspicion since patient has not left her home in 2 weeks.  Patient currently has no signs symptoms of respiratory illness including cough, shortness of breath or hypoxia.     Hypertensive urgency  Assessment & Plan  Sacha tented with a blood pressure greater than 200 without signs of end organ damage  Uncontrolled  Start on Norvasc 10 mg  IV as needed medications have been ordered        VTE prophylaxis: SCD

## 2020-04-16 NOTE — ED NOTES
Patient arrives with c/o painful, raised area to right armpit. Patient states present for extensive period of time, recently increasingly painful. Patient with noted large, approx golf-ball sized raised area to right armpit. Denies additional complaints. Patient educated on current hypertension, patient states she was previously on mediation for HTN when living in [facility] but has been out of meds for approx. 1 year, states she would like to find a PCP. ERP at bedside. Pt AAOx4, speech clear, fluent, respirs easy, unlabored, skin warm, dry, color appropriate. Patient updated on plan of care and patient verbalizes understanding. All questions answered, denies additional needs. Will continue to monitor. Call light within reach.

## 2020-04-16 NOTE — ED NOTES
Patient is resting comfortably on cart. Patient updated on POC by hospitalist at bedside. Patient ambulates to restroom and back to ED cart with steady upright gait. Patient placed in surgical mask prior to ambulating. Patient denies additional needs or questions, states relief of right armpit pain. Pt denies additional symptoms, will continue to monitor.

## 2020-04-17 PROBLEM — A41.9 SEPSIS (HCC): Status: ACTIVE | Noted: 2020-04-17

## 2020-04-17 LAB
ALBUMIN SERPL BCP-MCNC: 4.1 G/DL (ref 3.2–4.9)
ALBUMIN/GLOB SERPL: 1.4 G/DL
ALP SERPL-CCNC: 76 U/L (ref 30–99)
ALT SERPL-CCNC: 14 U/L (ref 2–50)
ANION GAP SERPL CALC-SCNC: 15 MMOL/L (ref 7–16)
AST SERPL-CCNC: 11 U/L (ref 12–45)
BASOPHILS # BLD AUTO: 0.3 % (ref 0–1.8)
BASOPHILS # BLD: 0.05 K/UL (ref 0–0.12)
BILIRUB SERPL-MCNC: 0.4 MG/DL (ref 0.1–1.5)
BUN SERPL-MCNC: 10 MG/DL (ref 8–22)
CALCIUM SERPL-MCNC: 8.8 MG/DL (ref 8.5–10.5)
CHLORIDE SERPL-SCNC: 103 MMOL/L (ref 96–112)
CHOLEST SERPL-MCNC: 211 MG/DL (ref 100–199)
CO2 SERPL-SCNC: 20 MMOL/L (ref 20–33)
COVID ORDER STATUS COVID19: NORMAL
CREAT SERPL-MCNC: 0.64 MG/DL (ref 0.5–1.4)
EOSINOPHIL # BLD AUTO: 0.03 K/UL (ref 0–0.51)
EOSINOPHIL NFR BLD: 0.2 % (ref 0–6.9)
ERYTHROCYTE [DISTWIDTH] IN BLOOD BY AUTOMATED COUNT: 39.9 FL (ref 35.9–50)
EST. AVERAGE GLUCOSE BLD GHB EST-MCNC: 120 MG/DL
GLOBULIN SER CALC-MCNC: 2.9 G/DL (ref 1.9–3.5)
GLUCOSE SERPL-MCNC: 148 MG/DL (ref 65–99)
HBA1C MFR BLD: 5.8 % (ref 0–5.6)
HCT VFR BLD AUTO: 42.4 % (ref 37–47)
HDLC SERPL-MCNC: 52 MG/DL
HGB BLD-MCNC: 14.6 G/DL (ref 12–16)
IMM GRANULOCYTES # BLD AUTO: 0.28 K/UL (ref 0–0.11)
IMM GRANULOCYTES NFR BLD AUTO: 1.6 % (ref 0–0.9)
LACTATE BLD-SCNC: 2 MMOL/L (ref 0.5–2)
LACTATE BLD-SCNC: 2.3 MMOL/L (ref 0.5–2)
LDLC SERPL CALC-MCNC: 133 MG/DL
LYMPHOCYTES # BLD AUTO: 1.1 K/UL (ref 1–4.8)
LYMPHOCYTES NFR BLD: 6.2 % (ref 22–41)
MCH RBC QN AUTO: 29 PG (ref 27–33)
MCHC RBC AUTO-ENTMCNC: 34.4 G/DL (ref 33.6–35)
MCV RBC AUTO: 84.3 FL (ref 81.4–97.8)
MONOCYTES # BLD AUTO: 0.63 K/UL (ref 0–0.85)
MONOCYTES NFR BLD AUTO: 3.5 % (ref 0–13.4)
NEUTROPHILS # BLD AUTO: 15.76 K/UL (ref 2–7.15)
NEUTROPHILS NFR BLD: 88.2 % (ref 44–72)
NRBC # BLD AUTO: 0 K/UL
NRBC BLD-RTO: 0 /100 WBC
PLATELET # BLD AUTO: 275 K/UL (ref 164–446)
PMV BLD AUTO: 10.1 FL (ref 9–12.9)
POTASSIUM SERPL-SCNC: 3.8 MMOL/L (ref 3.6–5.5)
PROT SERPL-MCNC: 7 G/DL (ref 6–8.2)
RBC # BLD AUTO: 5.03 M/UL (ref 4.2–5.4)
SARS-COV-2 RNA RESP QL NAA+PROBE: NEGATIVE
SODIUM SERPL-SCNC: 138 MMOL/L (ref 135–145)
SPECIMEN SOURCE: NORMAL
TRIGL SERPL-MCNC: 129 MG/DL (ref 0–149)
WBC # BLD AUTO: 17.9 K/UL (ref 4.8–10.8)

## 2020-04-17 PROCEDURE — 700105 HCHG RX REV CODE 258: Performed by: INTERNAL MEDICINE

## 2020-04-17 PROCEDURE — 700111 HCHG RX REV CODE 636 W/ 250 OVERRIDE (IP): Performed by: HOSPITALIST

## 2020-04-17 PROCEDURE — 96376 TX/PRO/DX INJ SAME DRUG ADON: CPT

## 2020-04-17 PROCEDURE — 96367 TX/PROPH/DG ADDL SEQ IV INF: CPT

## 2020-04-17 PROCEDURE — 700102 HCHG RX REV CODE 250 W/ 637 OVERRIDE(OP): Performed by: HOSPITALIST

## 2020-04-17 PROCEDURE — 99233 SBSQ HOSP IP/OBS HIGH 50: CPT | Performed by: HOSPITALIST

## 2020-04-17 PROCEDURE — 36415 COLL VENOUS BLD VENIPUNCTURE: CPT

## 2020-04-17 PROCEDURE — 80053 COMPREHEN METABOLIC PANEL: CPT

## 2020-04-17 PROCEDURE — 700105 HCHG RX REV CODE 258: Performed by: HOSPITALIST

## 2020-04-17 PROCEDURE — 700101 HCHG RX REV CODE 250: Performed by: HOSPITALIST

## 2020-04-17 PROCEDURE — 770020 HCHG ROOM/CARE - TELE (206)

## 2020-04-17 PROCEDURE — G2023 SPECIMEN COLLECT COVID-19: HCPCS | Performed by: HOSPITALIST

## 2020-04-17 PROCEDURE — A9270 NON-COVERED ITEM OR SERVICE: HCPCS | Performed by: HOSPITALIST

## 2020-04-17 PROCEDURE — 83036 HEMOGLOBIN GLYCOSYLATED A1C: CPT

## 2020-04-17 PROCEDURE — 85025 COMPLETE CBC W/AUTO DIFF WBC: CPT

## 2020-04-17 PROCEDURE — 83605 ASSAY OF LACTIC ACID: CPT

## 2020-04-17 PROCEDURE — U0004 COV-19 TEST NON-CDC HGH THRU: HCPCS

## 2020-04-17 PROCEDURE — 80061 LIPID PANEL: CPT

## 2020-04-17 RX ORDER — SODIUM CHLORIDE, SODIUM LACTATE, POTASSIUM CHLORIDE, AND CALCIUM CHLORIDE .6; .31; .03; .02 G/100ML; G/100ML; G/100ML; G/100ML
500 INJECTION, SOLUTION INTRAVENOUS
Status: DISCONTINUED | OUTPATIENT
Start: 2020-04-17 | End: 2020-04-19

## 2020-04-17 RX ORDER — SODIUM CHLORIDE 9 MG/ML
500 INJECTION, SOLUTION INTRAVENOUS ONCE
Status: COMPLETED | OUTPATIENT
Start: 2020-04-17 | End: 2020-04-17

## 2020-04-17 RX ORDER — SODIUM CHLORIDE 9 MG/ML
INJECTION, SOLUTION INTRAVENOUS ONCE
Status: COMPLETED | OUTPATIENT
Start: 2020-04-17 | End: 2020-04-17

## 2020-04-17 RX ADMIN — DOXYCYCLINE 100 MG: 100 TABLET ORAL at 17:10

## 2020-04-17 RX ADMIN — AMPICILLIN SODIUM AND SULBACTAM SODIUM 3 G: 2; 1 INJECTION, POWDER, FOR SOLUTION INTRAMUSCULAR; INTRAVENOUS at 23:26

## 2020-04-17 RX ADMIN — AMLODIPINE BESYLATE 10 MG: 10 TABLET ORAL at 05:32

## 2020-04-17 RX ADMIN — SODIUM CHLORIDE 500 ML: 9 INJECTION, SOLUTION INTRAVENOUS at 02:12

## 2020-04-17 RX ADMIN — TRAMADOL HYDROCHLORIDE 50 MG: 50 TABLET, FILM COATED ORAL at 21:26

## 2020-04-17 RX ADMIN — DOXYCYCLINE 100 MG: 100 TABLET ORAL at 05:32

## 2020-04-17 RX ADMIN — FAMOTIDINE 40 MG: 20 TABLET ORAL at 05:32

## 2020-04-17 RX ADMIN — LABETALOL HYDROCHLORIDE 10 MG: 5 INJECTION, SOLUTION INTRAVENOUS at 19:29

## 2020-04-17 RX ADMIN — AMPICILLIN SODIUM AND SULBACTAM SODIUM 3 G: 2; 1 INJECTION, POWDER, FOR SOLUTION INTRAMUSCULAR; INTRAVENOUS at 17:10

## 2020-04-17 RX ADMIN — AMOXICILLIN AND CLAVULANATE POTASSIUM 1 TABLET: 500; 125 TABLET, FILM COATED ORAL at 01:11

## 2020-04-17 RX ADMIN — HYDRALAZINE HYDROCHLORIDE 20 MG: 20 INJECTION INTRAMUSCULAR; INTRAVENOUS at 21:26

## 2020-04-17 RX ADMIN — TRAMADOL HYDROCHLORIDE 50 MG: 50 TABLET, FILM COATED ORAL at 02:10

## 2020-04-17 RX ADMIN — LABETALOL HYDROCHLORIDE 10 MG: 5 INJECTION, SOLUTION INTRAVENOUS at 20:49

## 2020-04-17 RX ADMIN — AMPICILLIN SODIUM AND SULBACTAM SODIUM 3 G: 2; 1 INJECTION, POWDER, FOR SOLUTION INTRAMUSCULAR; INTRAVENOUS at 12:45

## 2020-04-17 RX ADMIN — SODIUM CHLORIDE: 9 INJECTION, SOLUTION INTRAVENOUS at 08:12

## 2020-04-17 RX ADMIN — HYDRALAZINE HYDROCHLORIDE 20 MG: 20 INJECTION INTRAMUSCULAR; INTRAVENOUS at 15:40

## 2020-04-17 ASSESSMENT — COGNITIVE AND FUNCTIONAL STATUS - GENERAL
SUGGESTED CMS G CODE MODIFIER DAILY ACTIVITY: CH
DAILY ACTIVITIY SCORE: 24
TURNING FROM BACK TO SIDE WHILE IN FLAT BAD: A LITTLE
SUGGESTED CMS G CODE MODIFIER MOBILITY: CJ
MOVING FROM LYING ON BACK TO SITTING ON SIDE OF FLAT BED: A LITTLE
MOBILITY SCORE: 21
CLIMB 3 TO 5 STEPS WITH RAILING: A LITTLE

## 2020-04-17 ASSESSMENT — ENCOUNTER SYMPTOMS
DIZZINESS: 0
PALPITATIONS: 0
NECK PAIN: 0
CHILLS: 0
HEADACHES: 0
BRUISES/BLEEDS EASILY: 0
HEMOPTYSIS: 0
BLURRED VISION: 0
MYALGIAS: 0
FEVER: 0
ORTHOPNEA: 0
DOUBLE VISION: 0
NAUSEA: 0
DEPRESSION: 0
BACK PAIN: 0
COUGH: 0
VOMITING: 0
HEARTBURN: 0

## 2020-04-17 ASSESSMENT — COPD QUESTIONNAIRES
DO YOU EVER COUGH UP ANY MUCUS OR PHLEGM?: YES, A FEW DAYS A WEEK OR MONTH
HAVE YOU SMOKED AT LEAST 100 CIGARETTES IN YOUR ENTIRE LIFE: NO/DON'T KNOW
COPD SCREENING SCORE: 2
DURING THE PAST 4 WEEKS HOW MUCH DID YOU FEEL SHORT OF BREATH: NONE/LITTLE OF THE TIME
IN THE PAST 12 MONTHS DO YOU DO LESS THAN YOU USED TO BECAUSE OF YOUR BREATHING PROBLEMS: DISAGREE/UNSURE

## 2020-04-17 ASSESSMENT — LIFESTYLE VARIABLES
EVER HAD A DRINK FIRST THING IN THE MORNING TO STEADY YOUR NERVES TO GET RID OF A HANGOVER: NO
AVERAGE NUMBER OF DAYS PER WEEK YOU HAVE A DRINK CONTAINING ALCOHOL: 0
TOTAL SCORE: 0
TOTAL SCORE: 0
HAVE YOU EVER FELT YOU SHOULD CUT DOWN ON YOUR DRINKING: NO
TOTAL SCORE: 0
ON A TYPICAL DAY WHEN YOU DRINK ALCOHOL HOW MANY DRINKS DO YOU HAVE: 0
DOES PATIENT WANT TO STOP DRINKING: NO
ALCOHOL_USE: NO
HAVE PEOPLE ANNOYED YOU BY CRITICIZING YOUR DRINKING: NO
HOW MANY TIMES IN THE PAST YEAR HAVE YOU HAD 5 OR MORE DRINKS IN A DAY: 0
EVER_SMOKED: NEVER
EVER FELT BAD OR GUILTY ABOUT YOUR DRINKING: NO
CONSUMPTION TOTAL: NEGATIVE

## 2020-04-17 ASSESSMENT — PATIENT HEALTH QUESTIONNAIRE - PHQ9
2. FEELING DOWN, DEPRESSED, IRRITABLE, OR HOPELESS: NOT AT ALL
1. LITTLE INTEREST OR PLEASURE IN DOING THINGS: NOT AT ALL
SUM OF ALL RESPONSES TO PHQ9 QUESTIONS 1 AND 2: 0

## 2020-04-17 ASSESSMENT — FIBROSIS 4 INDEX: FIB4 SCORE: 0.45

## 2020-04-17 NOTE — ED NOTES
Called tele RN bed is ready. Pt is aware of POC. Pt belongings are on bed. Pt is ready for transport.

## 2020-04-17 NOTE — PROGRESS NOTES
Report received from night shift RN, assumed care of pt. Pt A&O4 , in no apparent distress.Plan of care discussed with pt, labs and chart reviewed. All needs met at this time. Tele box on, pt in bed. Call light within reach, bed locked and in lowest position. All fall precautions and hourly rounding in place. Will continue to monitor.

## 2020-04-17 NOTE — PROGRESS NOTES
Bedside report received from Alysha TOVAR. Assumed care of patient. Pt. resting comfortably without any sign of distress. A&Ox4, VSS, BP is high PRN given, no complaints at this time. POC reviewed and white board updated, Tele box on, Call light in reach, Bed locked in lowest position. Will continue to monitor.

## 2020-04-17 NOTE — ED NOTES
Gave report to Alysha TOVAR. Pt transported to floor on monitors. All pt care responsibilities relinquished.

## 2020-04-17 NOTE — DISCHARGE PLANNING
Care Transition Team Assessment    Information Given By: Patient  Informant's Name: Kacey  Who is responsible for making decisions for patient? : Patient    In the case of an emergency, please contact Dallin No at [phone].     This RN Case Manager spoke with the patient via telephone bedside and obtained the information used in this assessment. Patient verified accuracy of facesheet. Patient lives with her  and 10 year old daughter in a first floor apartment. Patient uses the [facility] pharmacy on [address] Patient is completely independent with ADLS/IADLS. Patient does not have a PCP. Patient is employed at the [employer] - so she is currently not wording due to the COVID 19 pandemic. Patient's  is currently working in [place]. Patient's discharge plan is pending medical clearance. This RN Case Manager offered resources to establish with PCP.     Elopement Risk  Legal Hold: No  Elopement Risk: Not at Risk for Elopement    Interdisciplinary Discharge Planning  Lives with - Patient's Self Care Capacity: Significant Other, Child Less than 18 Years of Age  Housing / Facility: 1 Story Apartment / Condo  Able to Return to Previous ADL's: Yes  Mobility Issues: No  Prior Services: None  Patient Expects to be Discharged to: home     Finances  Prescription Coverage: Yes     Domestic Abuse  Have you ever been the victim of abuse or violence?: No     Discharge Risks or Barriers  Discharge risks or barriers?: No PCP    Anticipated Discharge Information  Anticipated discharge disposition: Home  Discharge Address: [facility] [address] [place] [address]  Discharge Contact Phone Number: [phone]

## 2020-04-17 NOTE — PROGRESS NOTES
[facility] Medicine Daily Progress Note    Date of Service  4/17/2020    Chief Complaint  44 y.o. female admitted 4/16/2020 with htn urgency      Interval Problem Update  Feeling better, alert and oriented, no fever or chills, has tenderness in the right axillary area.   No cough, no sob.    Consultants/Specialty  none    Code Status  Full code    Disposition  Home when stable.    Review of Systems  Review of Systems   Constitutional: Negative for chills and fever.   HENT: Negative for congestion and nosebleeds.    Eyes: Negative for blurred vision and double vision.   Respiratory: Negative for cough and hemoptysis.    Cardiovascular: Negative for chest pain, palpitations and orthopnea.   Gastrointestinal: Negative for heartburn, nausea and vomiting.   Genitourinary: Negative for frequency, hematuria and urgency.   Musculoskeletal: Negative for back pain, myalgias and neck pain.   Skin: Negative for rash.   Neurological: Negative for dizziness and headaches.   Endo/Heme/Allergies: Does not bruise/bleed easily.   Psychiatric/Behavioral: Negative for depression.        Physical Exam  Temp:  [36.7 °C (98.1 °F)-37.3 °C (99.2 °F)] 36.9 °C (98.5 °F)  Pulse:  [] 98  Resp:  [14-39] 19  BP: (114-217)/() 126/86  SpO2:  [94 %-100 %] 100 %    Physical Exam  Vitals signs and nursing note reviewed.   Constitutional:       General: She is not in acute distress.     Appearance: Normal appearance.   HENT:      Head: Normocephalic and atraumatic.      Nose: Nose normal. No congestion or rhinorrhea.      Mouth/Throat:      Mouth: Mucous membranes are moist.   Eyes:      General:         Right eye: No discharge.         Left eye: No discharge.      Extraocular Movements: Extraocular movements intact.      Conjunctiva/sclera: Conjunctivae normal.      Pupils: Pupils are equal, round, and reactive to light.   Neck:      Musculoskeletal: Normal range of motion and neck supple. No neck rigidity or muscular tenderness.    Cardiovascular:      Rate and Rhythm: Normal rate and regular rhythm.      Pulses: Normal pulses.      Heart sounds: Normal heart sounds.   Pulmonary:      Effort: Pulmonary effort is normal. No respiratory distress.      Breath sounds: Normal breath sounds. No wheezing.   Abdominal:      General: Bowel sounds are normal. There is no distension.      Palpations: Abdomen is soft.      Tenderness: There is no abdominal tenderness. There is no guarding.   Musculoskeletal: Normal range of motion.         General: Tenderness (right axillary area. inflammation area 5x5 cm drain in place. ) present.   Skin:     General: Skin is dry.      Capillary Refill: Capillary refill takes less than 2 seconds.   Neurological:      General: No focal deficit present.      Mental Status: She is alert and oriented to person, place, and time.      Cranial Nerves: No cranial nerve deficit.      Motor: No weakness.   Psychiatric:         Mood and Affect: Mood normal.         Behavior: Behavior normal.         Fluids    Intake/Output Summary (Last 24 hours) at 4/17/2020 1328  Last data filed at 4/17/2020 1000  Gross per 24 hour   Intake 410 ml   Output --   Net 410 ml       Laboratory  Recent Labs     04/16/20  1349 04/17/20  0306   WBC 14.7* 17.9*   RBC 5.48* 5.03   HEMOGLOBIN 16.0 14.6   HEMATOCRIT 45.6 42.4   MCV 83.2 84.3   MCH 29.2 29.0   MCHC 35.1* 34.4   RDW 38.6 39.9   PLATELETCT 262 275   MPV 9.9 10.1     Recent Labs     04/16/20  1349 04/17/20  0306   SODIUM 137 138   POTASSIUM 3.3* 3.8   CHLORIDE 99 103   CO2 26 20   GLUCOSE 112* 148*   BUN 13 10   CREATININE 0.71 0.64   CALCIUM 9.4 8.8             Recent Labs     04/17/20  0306   TRIGLYCERIDE 129   HDL 52   *       Imaging  EC-ECHOCARDIOGRAM COMPLETE W/O CONT   Final Result      DX-CHEST-PORTABLE (1 VIEW)   Final Result      1.  Patchy bilateral interstitial and airspace opacities are suggestive of atypical/viral infection.      2.  Prominent soft tissue projecting over  the right axilla may represent breast tissue.      3.  Faint radiopaque density projecting over the lateral right axilla may be external to the patient or represent faint calcification.           Assessment/Plan  * Hidradenitis  Assessment & Plan  Repeated episodes of right axillary swelling  I&D performed in ER by ER physician  Per ERP report pus was present, Cx showing wbc and gram positive cocci  Changed to iv unasyn continue po doxy.     Sepsis (HCC)  Assessment & Plan  This is Sepsis Present on admission  SIRS criteria identified on my evaluation include: Leukopenia, with WBC less than 4,000 tachycardia   Source is right axillary abscess.   Sepsis protocol initiated  Fluid resuscitation ordered per protocol  IV antibiotics as appropriate for source of sepsis  While organ dysfunction may be noted elsewhere in this problem list or in the chart, degree of organ dysfunction does not meet CMS criteria for severe sepsis          Abnormal finding of lung  Assessment & Plan  Patient has right-sided lower lobe infiltrates that may be groundglass appearance.  ER physician was concerned for COVID which has been sent in the ER.  covid 19 neg x 1 will repeat today.     Hypertensive urgency  Assessment & Plan  Sacha tented with a blood pressure greater than 200 without signs of end organ damage  Improved continue po amlodipine          VTE prophylaxis: scd's.

## 2020-04-17 NOTE — PROGRESS NOTES
2 RN Skin Check    2 RN skin check completed with Alysha TOVAR  Devices in place: NA.  Skin assessed under devices: N\A.  Confirmed pressure ulcers found on: NA.  New potential pressure ulcers noted on NA. Wound consult placed No.  The following interventions in place Pillows and Lotion.    Skin is CDI

## 2020-04-18 LAB
ANION GAP SERPL CALC-SCNC: 12 MMOL/L (ref 7–16)
BACTERIA WND AEROBE CULT: ABNORMAL
BACTERIA WND AEROBE CULT: ABNORMAL
BASOPHILS # BLD AUTO: 0.3 % (ref 0–1.8)
BASOPHILS # BLD: 0.05 K/UL (ref 0–0.12)
BUN SERPL-MCNC: 10 MG/DL (ref 8–22)
CALCIUM SERPL-MCNC: 9.4 MG/DL (ref 8.5–10.5)
CHLORIDE SERPL-SCNC: 101 MMOL/L (ref 96–112)
CO2 SERPL-SCNC: 24 MMOL/L (ref 20–33)
CREAT SERPL-MCNC: 0.57 MG/DL (ref 0.5–1.4)
EOSINOPHIL # BLD AUTO: 0.09 K/UL (ref 0–0.51)
EOSINOPHIL NFR BLD: 0.5 % (ref 0–6.9)
ERYTHROCYTE [DISTWIDTH] IN BLOOD BY AUTOMATED COUNT: 41.1 FL (ref 35.9–50)
GLUCOSE SERPL-MCNC: 132 MG/DL (ref 65–99)
GRAM STN SPEC: ABNORMAL
HCT VFR BLD AUTO: 44.5 % (ref 37–47)
HGB BLD-MCNC: 15.2 G/DL (ref 12–16)
IMM GRANULOCYTES # BLD AUTO: 0.17 K/UL (ref 0–0.11)
IMM GRANULOCYTES NFR BLD AUTO: 0.9 % (ref 0–0.9)
LYMPHOCYTES # BLD AUTO: 1.2 K/UL (ref 1–4.8)
LYMPHOCYTES NFR BLD: 6.7 % (ref 22–41)
MCH RBC QN AUTO: 29 PG (ref 27–33)
MCHC RBC AUTO-ENTMCNC: 34.2 G/DL (ref 33.6–35)
MCV RBC AUTO: 84.9 FL (ref 81.4–97.8)
MONOCYTES # BLD AUTO: 0.59 K/UL (ref 0–0.85)
MONOCYTES NFR BLD AUTO: 3.3 % (ref 0–13.4)
NEUTROPHILS # BLD AUTO: 15.83 K/UL (ref 2–7.15)
NEUTROPHILS NFR BLD: 88.3 % (ref 44–72)
NRBC # BLD AUTO: 0 K/UL
NRBC BLD-RTO: 0 /100 WBC
PLATELET # BLD AUTO: 300 K/UL (ref 164–446)
PMV BLD AUTO: 10 FL (ref 9–12.9)
POTASSIUM SERPL-SCNC: 3.9 MMOL/L (ref 3.6–5.5)
RBC # BLD AUTO: 5.24 M/UL (ref 4.2–5.4)
SIGNIFICANT IND 70042: ABNORMAL
SITE SITE: ABNORMAL
SODIUM SERPL-SCNC: 137 MMOL/L (ref 135–145)
SOURCE SOURCE: ABNORMAL
WBC # BLD AUTO: 17.9 K/UL (ref 4.8–10.8)

## 2020-04-18 PROCEDURE — 700111 HCHG RX REV CODE 636 W/ 250 OVERRIDE (IP): Performed by: HOSPITALIST

## 2020-04-18 PROCEDURE — A9270 NON-COVERED ITEM OR SERVICE: HCPCS | Performed by: HOSPITALIST

## 2020-04-18 PROCEDURE — 700102 HCHG RX REV CODE 250 W/ 637 OVERRIDE(OP): Performed by: HOSPITALIST

## 2020-04-18 PROCEDURE — 99233 SBSQ HOSP IP/OBS HIGH 50: CPT | Performed by: HOSPITALIST

## 2020-04-18 PROCEDURE — 0X940ZZ DRAINAGE OF RIGHT AXILLA, OPEN APPROACH: ICD-10-PCS | Performed by: SURGERY

## 2020-04-18 PROCEDURE — 85025 COMPLETE CBC W/AUTO DIFF WBC: CPT

## 2020-04-18 PROCEDURE — 36415 COLL VENOUS BLD VENIPUNCTURE: CPT

## 2020-04-18 PROCEDURE — 700105 HCHG RX REV CODE 258: Performed by: HOSPITALIST

## 2020-04-18 PROCEDURE — 80048 BASIC METABOLIC PNL TOTAL CA: CPT

## 2020-04-18 PROCEDURE — 770006 HCHG ROOM/CARE - MED/SURG/GYN SEMI*

## 2020-04-18 RX ORDER — LISINOPRIL 5 MG/1
5 TABLET ORAL
Status: DISCONTINUED | OUTPATIENT
Start: 2020-04-18 | End: 2020-04-20 | Stop reason: HOSPADM

## 2020-04-18 RX ADMIN — AMPICILLIN SODIUM AND SULBACTAM SODIUM 3 G: 2; 1 INJECTION, POWDER, FOR SOLUTION INTRAMUSCULAR; INTRAVENOUS at 17:24

## 2020-04-18 RX ADMIN — AMLODIPINE BESYLATE 10 MG: 10 TABLET ORAL at 05:11

## 2020-04-18 RX ADMIN — DOXYCYCLINE 100 MG: 100 TABLET ORAL at 05:11

## 2020-04-18 RX ADMIN — DOXYCYCLINE 100 MG: 100 TABLET ORAL at 17:25

## 2020-04-18 RX ADMIN — FAMOTIDINE 40 MG: 20 TABLET ORAL at 05:11

## 2020-04-18 RX ADMIN — AMPICILLIN SODIUM AND SULBACTAM SODIUM 3 G: 2; 1 INJECTION, POWDER, FOR SOLUTION INTRAMUSCULAR; INTRAVENOUS at 06:00

## 2020-04-18 RX ADMIN — LISINOPRIL 5 MG: 5 TABLET ORAL at 09:20

## 2020-04-18 RX ADMIN — AMPICILLIN SODIUM AND SULBACTAM SODIUM 3 G: 2; 1 INJECTION, POWDER, FOR SOLUTION INTRAMUSCULAR; INTRAVENOUS at 23:42

## 2020-04-18 RX ADMIN — AMPICILLIN SODIUM AND SULBACTAM SODIUM 3 G: 2; 1 INJECTION, POWDER, FOR SOLUTION INTRAMUSCULAR; INTRAVENOUS at 13:05

## 2020-04-18 ASSESSMENT — ENCOUNTER SYMPTOMS
DOUBLE VISION: 0
DIZZINESS: 0
ABDOMINAL PAIN: 0
MYALGIAS: 0
COUGH: 0
WEIGHT LOSS: 0
FEVER: 0
NECK PAIN: 0
BACK PAIN: 0
DEPRESSION: 0
VOMITING: 0
HEADACHES: 0
NAUSEA: 0
BLURRED VISION: 0
HEARTBURN: 0
HEMOPTYSIS: 0
PALPITATIONS: 0
BRUISES/BLEEDS EASILY: 0

## 2020-04-18 ASSESSMENT — PATIENT HEALTH QUESTIONNAIRE - PHQ9
2. FEELING DOWN, DEPRESSED, IRRITABLE, OR HOPELESS: NOT AT ALL
SUM OF ALL RESPONSES TO PHQ9 QUESTIONS 1 AND 2: 0
1. LITTLE INTEREST OR PLEASURE IN DOING THINGS: NOT AT ALL

## 2020-04-18 NOTE — CONSULTS
Chief complaint:    Chief Complaint   Patient presents with   • Lump     R arm pit for 2 years, painful now     Date of admission:  4/16/2020  Date of consult:  4/18/2020  Travel History:  Unknown   Primary residence:  [address] [place] [address]  [address] [place] [address]  Direct contact with confirmed COVID: None charted   Oxygen support:  RA  Imaging:  CXR with  - Patchy bilateral interstitial and airspace opacities are suggestive of atypical/viral infection.  WBC (K/ul):  17.9   Absolute Lymphocyte Count (ALC):  1200  [facility] criteria? Yes     Recommendation:     --- Pt admitted for Hydradenitis and CXR with concerning finding so COVID-19 was sent despite lack of any respiratory symptoms   --- The patient has no reported exposure to COVID-19. The patient has been afebrile and reamins on room air.     --- Negative COVID-19 test is negative x2  --- Recommend de-escalate isolation.      Infection Prevention called?  :  Yes

## 2020-04-18 NOTE — CARE PLAN
Problem: Communication  Goal: The ability to communicate needs accurately and effectively will improve  Outcome: PROGRESSING AS EXPECTED     Problem: Safety  Goal: Will remain free from falls  Outcome: PROGRESSING AS EXPECTED     Problem: Infection  Goal: Will remain free from infection  Outcome: PROGRESSING AS EXPECTED  Intervention: Assess signs and symptoms of infection  Note: Proper hand hygeine performed.  Assessed wound area of armpit for signs and symptoms of infection.  Will continue to monitor for S/S

## 2020-04-18 NOTE — OP REPORT
04/18/20    OPERATIVE NOTE    PRE-OPERATIVE DIAGNOSIS - Axillary Abscess    POST-OPERATIVE DIAGNOSIS - Same    PROCEDURE PERFORMED - Incision and Drainage of multiloculated abscess right axilla     SURGEON - Rei Wilkerson M.D.    ASSISTANT - None    TYPE OF ANESTHESIA - Local    PROCEDURE IN DETAIL -     Patient's right axillary area was prepped and draped in sterile fashion.  1% lidocaine with epinephrine was infiltrated into the surrounding area.  The Nu Gauze was removed from the existing incision.  A 15 blade scalpel was used to extend that incision approximately 2 cm.  A bulb syringe was then used to irrigate the abscess cavity.  My finger was inserted into the abscess cavity and several loculations were broken up.  Once the loculations and septations were broken up digitally additional irrigation took place.  Once I was confident that all loculated areas were drained and properly irrigated a small gauze soaked in saline was packed within the resultant cavity.  Hemostasis was assured.  Patient tolerated procedure well.  Gauze was placed over the incision.      Rei Wilkerson M.D.

## 2020-04-18 NOTE — PROGRESS NOTES
Spoke with kerry from lab to check on status of covid test for Pt.  Lab informed me via phone that the patient is negative.

## 2020-04-18 NOTE — PROGRESS NOTES
"Assumed care of patient from T7 RN.  Patient is alert and oriented times 4, states pain of 4/10, declines intervention at this time.  VSS /95   Pulse 92   Temp 36.9 °C (98.5 °F)   Resp 18   Ht 1.6 m (5' 3\")   Wt 79.3 kg (174 lb 13.2 oz)   SpO2 94%   BMI 30.97 kg/m²   PIV in the LFA, patent and running TKO with antibiotics.  PIV in the RFA, patent and saline locked.  On RA with saturations in the mid 90s.  Last BM 4/17, urinating without difficulty.  Regular diet, tolerating well.  Redness and swelling to the R axilla, dressing CDI.  Packing to be changed tomorrow.  Patient is up self, demonstrates steady gait, no assistance needed.  Patient oriented to the unit, POC discussed for the day.  Bed is locked and in the lowest position, call light is within reach.  All needs are met at this time, hourly rounding is in place.    "

## 2020-04-18 NOTE — CARE PLAN
Problem: Safety  Goal: Will remain free from injury  Outcome: PROGRESSING AS EXPECTED  Note: Patient educated to dangle at bedside prior to standing     Problem: Pain Management  Goal: Pain level will decrease to patient's comfort goal  Outcome: PROGRESSING AS EXPECTED  Note: Patient states pain of 4/10, declines intervention at this time.  Will continue to monitor.

## 2020-04-18 NOTE — CONSULTS
DATE OF CONSULTATION: 4/18/2020     REFERRING PHYSICIAN: MD Aurora.     CONSULTING PHYSICIAN: Rei Wilkerson M.D.      REASON FOR CONSULTATION: Evaluate patient with right axillary abscess.     HISTORY OF PRESENT ILLNESS: The patient is a 44-year-old female who was admitted to the hospital 2 days ago with swelling under her right axillary area.  She reports that she has had a mass in the right axillary region for the past 2 years she reports that last week the swelling increased and she began developing fevers and chills.  An abscess was diagnosed in the emergency room and the emergency room physician performed an I&D.  There is a small 5 mm incision overlying this area with Nu Gauze packed within the abscess cavity.  There is significant residual purulence in the area.  I have been consulted for consideration for possible additional washout debridement.    PAST MEDICAL HISTORY:  has a past medical history of Hypertension and Pneumonia.     PAST SURGICAL HISTORY: patient denies any surgical history     ALLERGIES: No Known Allergies     CURRENT MEDICATIONS:   Home Medications     Reviewed by Jeromy Gorman (Pharmacy Tech) on 04/16/20 at 1632  Med List Status: Unable to Obtain   Medication Last Dose Status        Patient Daljit Taking any Medications                       FAMILY HISTORY: No family history on file.     SOCIAL HISTORY:   Social History     Tobacco Use   • Smoking status: Never Smoker   • Smokeless tobacco: Never Used   Substance and Sexual Activity   • Alcohol use: Not on file   • Drug use: Not on file   • Sexual activity: Not on file       Review of Systems:      Constitutional: Denies fevers, Denies weight changes  Eyes: Denies changes in vision, no eye pain  Ears/Nose/Throat/Mouth: Denies nasal congestion or sore throat   Cardiovascular: Denies chest pain or palpitations.  Respiratory: Denies shortness of breath, cough, and wheezing.  Gastrointestinal/Hepatic: Denies abdominal pain,  nausea, vomiting, diarrhea, constipation or GI bleeding   Genitourinary: Denies dysuria or frequency  Musculoskeletal/Rheum: Reports pain right axillary area   skin: Denies rash  Neurological: Denies headache, confusion, memory loss or focal weakness/parasthesias  Psychiatric: denies mood disorder   Endocrine: Angelica thyroid problems  Heme/Oncology/Lymph Nodes: Denies enlarged lymph nodes, denies brusing or known bleeding disorder  All other systems were reviewed and are negative (AMA/CMS criteria)                  PHYSICAL EXAMINATION:       Constitutional:   Well developed, Well nourished, No acute distress  HENMT:  Normocephalic, Atraumatic, Oropharynx moist mucous membranes, No oral exudates, Nose normal.  No thyromegaly.  Eyes:  EOMI, Conjunctiva normal, No discharge.  Neck:  Normal range of motion, No cervical tenderness,  no JVD.  Cardiovascular:  Normal heart rate, Normal rhythm, No murmurs, No rubs, No gallops.     Extremitites there is a soft palpable mass in the right axillary area with a small incision and Nu Gauze emanating from the incision.  There is purulence noted throughout the area  Lungs:  Normal breath sounds, breath sounds clear to auscultation bilaterally,  no crackles, no wheezing.   Abdomen: Bowel sounds normal, Soft, No tenderness, No guarding, No rebound, No masses, No hepatosplenomegaly.  Skin: Warm, Dry, No erythema, No rash, no induration.  Neurologic: Alert & oriented x 3, No focal deficits noted, cranial nerves II through X are grossly intact.  Psychiatric: Affect normal, Judgment normal, Mood normal.        LABORATORY VALUES:   Recent Labs     04/16/20  1349 04/17/20  0306 04/18/20  0309   WBC 14.7* 17.9* 17.9*   RBC 5.48* 5.03 5.24   HEMOGLOBIN 16.0 14.6 15.2   HEMATOCRIT 45.6 42.4 44.5   MCV 83.2 84.3 84.9   MCH 29.2 29.0 29.0   MCHC 35.1* 34.4 34.2   RDW 38.6 39.9 41.1   PLATELETCT 262 275 300   MPV 9.9 10.1 10.0     Recent Labs     04/16/20  1349 04/17/20  0306 04/18/20  0309    SODIUM 137 138 137   POTASSIUM 3.3* 3.8 3.9   CHLORIDE 99 103 101   CO2 26 20 24   GLUCOSE 112* 148* 132*   BUN 13 10 10   CREATININE 0.71 0.64 0.57   CALCIUM 9.4 8.8 9.4     Recent Labs     04/16/20  1349 04/17/20  0306   ASTSGOT 12 11*   ALTSGPT 13 14   TBILIRUBIN 0.5 0.4   ALKPHOSPHAT 83 76   GLOBULIN 3.1 2.9            IMAGING:   EC-ECHOCARDIOGRAM COMPLETE W/O CONT   Final Result      DX-CHEST-PORTABLE (1 VIEW)   Final Result      1.  Patchy bilateral interstitial and airspace opacities are suggestive of atypical/viral infection.      2.  Prominent soft tissue projecting over the right axilla may represent breast tissue.      3.  Faint radiopaque density projecting over the lateral right axilla may be external to the patient or represent faint calcification.          IMPRESSION AND PLAN:     Active Hospital Problems    Diagnosis   • Hidradenitis [L73.2]     Priority: High   • Sepsis (HCC) [A41.9]   • Hypertensive urgency [I16.0]   • Abnormal finding of lung [R09.89]     I believe that the abscess is an adequately drained and therefore will perform a bedside washout and debridement.  Unsure what the etiology of the underlying mass is.  Could be a lipoma versus sebaceous cyst versus other.  We will plan to perform I&D at bedside, packed the abscess cavity, initiate dressing changes, continue antibiotic, will follow-up with patient as an outpatient and at some point will have to likely resect this subcutaneous mass in the right axillary area for diagnostic purposes.  I have encouraged patient to obtain a GYN appointment to obtain a mammogram.  She is never had a mammogram ____________________________________   Rei Wilkerson M.D.          DD: 4/18/2020   DT: 11:51 AM

## 2020-04-18 NOTE — PROGRESS NOTES
Spoke with Ashlie about elevated blood pressure. 10mg labetolol given, will give another 10, then hydralazine if blood pressure still elevated.  Will continue to monitor.

## 2020-04-18 NOTE — PROGRESS NOTES
[facility] Medicine Daily Progress Note    Date of Service  4/18/2020    Chief Complaint  44 y.o. female admitted 4/16/2020 with htn urgency      Interval Problem Update  Patient is resting in bed, pain is stable, continue having elevated blood pressure, will add lisinopril, continue having significant discharge of pus from the right axillary mass/abscess, general surgery has been consulted.  Patient is alert oriented follows commands no abdominal pain no nausea vomiting.    Consultants/Specialty  Surgery    Code Status  Full code    Disposition  Home when stable.    Review of Systems  Review of Systems   Constitutional: Negative for fever and weight loss.   HENT: Negative for congestion and nosebleeds.    Eyes: Negative for blurred vision and double vision.   Respiratory: Negative for cough and hemoptysis.    Cardiovascular: Negative for chest pain and palpitations.   Gastrointestinal: Negative for abdominal pain, heartburn, nausea and vomiting.   Genitourinary: Negative for frequency and urgency.   Musculoskeletal: Negative for back pain, myalgias and neck pain.   Skin: Negative for rash.   Neurological: Negative for dizziness and headaches.   Endo/Heme/Allergies: Does not bruise/bleed easily.   Psychiatric/Behavioral: Negative for depression.        Physical Exam  Temp:  [36.4 °C (97.5 °F)-37.2 °C (98.9 °F)] 36.9 °C (98.5 °F)  Pulse:  [] 92  Resp:  [16-19] 18  BP: (133-179)/() 139/95  SpO2:  [94 %-99 %] 94 %    Physical Exam  Vitals signs and nursing note reviewed.   Constitutional:       General: She is not in acute distress.     Appearance: Normal appearance.   HENT:      Head: Normocephalic and atraumatic.      Nose: Nose normal. No congestion or rhinorrhea.      Mouth/Throat:      Mouth: Mucous membranes are moist.   Eyes:      General:         Right eye: No discharge.         Left eye: No discharge.      Extraocular Movements: Extraocular movements intact.      Pupils: Pupils are equal, round, and  reactive to light.   Neck:      Musculoskeletal: Normal range of motion and neck supple. No neck rigidity or muscular tenderness.   Cardiovascular:      Rate and Rhythm: Normal rate and regular rhythm.      Pulses: Normal pulses.      Heart sounds: Normal heart sounds.   Pulmonary:      Effort: Pulmonary effort is normal. No respiratory distress.      Breath sounds: Normal breath sounds. No wheezing.   Abdominal:      General: Bowel sounds are normal. There is no distension.      Palpations: Abdomen is soft.      Tenderness: There is no abdominal tenderness. There is no guarding.   Musculoskeletal: Normal range of motion.         General: Tenderness (right axillary area. inflammation (mass) area 5x5 cm purulent discharge. ) present.   Skin:     General: Skin is dry.      Capillary Refill: Capillary refill takes less than 2 seconds.   Neurological:      General: No focal deficit present.      Mental Status: She is alert and oriented to person, place, and time.      Cranial Nerves: No cranial nerve deficit.      Motor: No weakness.   Psychiatric:         Mood and Affect: Mood normal.         Behavior: Behavior normal.         Fluids    Intake/Output Summary (Last 24 hours) at 4/18/2020 1407  Last data filed at 4/18/2020 1100  Gross per 24 hour   Intake 480 ml   Output 700 ml   Net -220 ml       Laboratory  Recent Labs     04/16/20  1349 04/17/20  0306 04/18/20  0309   WBC 14.7* 17.9* 17.9*   RBC 5.48* 5.03 5.24   HEMOGLOBIN 16.0 14.6 15.2   HEMATOCRIT 45.6 42.4 44.5   MCV 83.2 84.3 84.9   MCH 29.2 29.0 29.0   MCHC 35.1* 34.4 34.2   RDW 38.6 39.9 41.1   PLATELETCT 262 275 300   MPV 9.9 10.1 10.0     Recent Labs     04/16/20  1349 04/17/20  0306 04/18/20  0309   SODIUM 137 138 137   POTASSIUM 3.3* 3.8 3.9   CHLORIDE 99 103 101   CO2 26 20 24   GLUCOSE 112* 148* 132*   BUN 13 10 10   CREATININE 0.71 0.64 0.57   CALCIUM 9.4 8.8 9.4             Recent Labs     04/17/20  0306   TRIGLYCERIDE 129   HDL 52   *        Imaging  EC-ECHOCARDIOGRAM COMPLETE W/O CONT   Final Result      DX-CHEST-PORTABLE (1 VIEW)   Final Result      1.  Patchy bilateral interstitial and airspace opacities are suggestive of atypical/viral infection.      2.  Prominent soft tissue projecting over the right axilla may represent breast tissue.      3.  Faint radiopaque density projecting over the lateral right axilla may be external to the patient or represent faint calcification.           Assessment/Plan  * Hidradenitis  Assessment & Plan  Repeated episodes of right axillary swelling  I&D performed in ER by ER physician  Per ERP report pus was present, Cx showing wbc and gram positive cocci pending final report  Patient continue having purulent discharge, continue having leukocytosis, I have consulted general surgery for I&D, appreciate recommendations and evaluation by general surgery.  Continue IV antibiotics, follow-up final culture result.      Sepsis (HCC)  Assessment & Plan  This is Sepsis Present on admission  SIRS criteria identified on my evaluation include: Leukopenia, with WBC less than 4,000 tachycardia   Source is right axillary abscess.   Sepsis protocol initiated  Fluid resuscitation ordered per protocol  IV antibiotics as appropriate for source of sepsis  While organ dysfunction may be noted elsewhere in this problem list or in the chart, degree of organ dysfunction does not meet CMS criteria for severe sepsis  Continue having leukocytosis, continue having purulent discharge from right axillary area, I have consulted general surgery for I&D.          Abnormal finding of lung  Assessment & Plan  Patient has right-sided lower lobe infiltrates that may be groundglass appearance.  ER physician was concerned for COVID which has been sent in the ER.  covid 19 neg x 2 okay to de-escalate on isolation.    Hypertensive urgency  Assessment & Plan  Sacha tented with a blood pressure greater than 200 without signs of end organ  damage  Continue amlodipine, will add lisinopril today         VTE prophylaxis: scd's.     Case and plan of care discussed with nurse staff  Case and plan of care discussed during multidisciplinary rounds, and case and plan of care discussed with general surgery.

## 2020-04-19 LAB
BASOPHILS # BLD AUTO: 0.3 % (ref 0–1.8)
BASOPHILS # BLD: 0.04 K/UL (ref 0–0.12)
EKG IMPRESSION: NORMAL
EOSINOPHIL # BLD AUTO: 0.35 K/UL (ref 0–0.51)
EOSINOPHIL NFR BLD: 2.5 % (ref 0–6.9)
ERYTHROCYTE [DISTWIDTH] IN BLOOD BY AUTOMATED COUNT: 41.6 FL (ref 35.9–50)
HCT VFR BLD AUTO: 44.4 % (ref 37–47)
HGB BLD-MCNC: 14.9 G/DL (ref 12–16)
IMM GRANULOCYTES # BLD AUTO: 0.12 K/UL (ref 0–0.11)
IMM GRANULOCYTES NFR BLD AUTO: 0.9 % (ref 0–0.9)
LYMPHOCYTES # BLD AUTO: 2.65 K/UL (ref 1–4.8)
LYMPHOCYTES NFR BLD: 18.9 % (ref 22–41)
MCH RBC QN AUTO: 29.1 PG (ref 27–33)
MCHC RBC AUTO-ENTMCNC: 33.6 G/DL (ref 33.6–35)
MCV RBC AUTO: 86.7 FL (ref 81.4–97.8)
MONOCYTES # BLD AUTO: 0.91 K/UL (ref 0–0.85)
MONOCYTES NFR BLD AUTO: 6.5 % (ref 0–13.4)
NEUTROPHILS # BLD AUTO: 9.97 K/UL (ref 2–7.15)
NEUTROPHILS NFR BLD: 70.9 % (ref 44–72)
NRBC # BLD AUTO: 0 K/UL
NRBC BLD-RTO: 0 /100 WBC
PLATELET # BLD AUTO: 259 K/UL (ref 164–446)
PMV BLD AUTO: 10 FL (ref 9–12.9)
RBC # BLD AUTO: 5.12 M/UL (ref 4.2–5.4)
WBC # BLD AUTO: 14 K/UL (ref 4.8–10.8)

## 2020-04-19 PROCEDURE — 770006 HCHG ROOM/CARE - MED/SURG/GYN SEMI*

## 2020-04-19 PROCEDURE — 700102 HCHG RX REV CODE 250 W/ 637 OVERRIDE(OP): Performed by: HOSPITALIST

## 2020-04-19 PROCEDURE — 99232 SBSQ HOSP IP/OBS MODERATE 35: CPT | Performed by: INTERNAL MEDICINE

## 2020-04-19 PROCEDURE — 700105 HCHG RX REV CODE 258: Performed by: HOSPITALIST

## 2020-04-19 PROCEDURE — 700111 HCHG RX REV CODE 636 W/ 250 OVERRIDE (IP): Performed by: INTERNAL MEDICINE

## 2020-04-19 PROCEDURE — 36415 COLL VENOUS BLD VENIPUNCTURE: CPT

## 2020-04-19 PROCEDURE — 700111 HCHG RX REV CODE 636 W/ 250 OVERRIDE (IP): Performed by: HOSPITALIST

## 2020-04-19 PROCEDURE — 700102 HCHG RX REV CODE 250 W/ 637 OVERRIDE(OP): Performed by: INTERNAL MEDICINE

## 2020-04-19 PROCEDURE — 85025 COMPLETE CBC W/AUTO DIFF WBC: CPT

## 2020-04-19 PROCEDURE — A9270 NON-COVERED ITEM OR SERVICE: HCPCS | Performed by: INTERNAL MEDICINE

## 2020-04-19 PROCEDURE — A9270 NON-COVERED ITEM OR SERVICE: HCPCS | Performed by: HOSPITALIST

## 2020-04-19 RX ORDER — AMOXICILLIN AND CLAVULANATE POTASSIUM 875; 125 MG/1; MG/1
1 TABLET, FILM COATED ORAL EVERY 12 HOURS
Status: DISCONTINUED | OUTPATIENT
Start: 2020-04-19 | End: 2020-04-20 | Stop reason: HOSPADM

## 2020-04-19 RX ADMIN — DOXYCYCLINE 100 MG: 100 TABLET ORAL at 17:25

## 2020-04-19 RX ADMIN — AMLODIPINE BESYLATE 10 MG: 10 TABLET ORAL at 05:44

## 2020-04-19 RX ADMIN — TRAMADOL HYDROCHLORIDE 50 MG: 50 TABLET, FILM COATED ORAL at 06:01

## 2020-04-19 RX ADMIN — LISINOPRIL 5 MG: 5 TABLET ORAL at 05:44

## 2020-04-19 RX ADMIN — AMPICILLIN SODIUM AND SULBACTAM SODIUM 3 G: 2; 1 INJECTION, POWDER, FOR SOLUTION INTRAMUSCULAR; INTRAVENOUS at 05:44

## 2020-04-19 RX ADMIN — FAMOTIDINE 40 MG: 20 TABLET ORAL at 05:44

## 2020-04-19 RX ADMIN — AMOXICILLIN AND CLAVULANATE POTASSIUM 1 TABLET: 875; 125 TABLET, FILM COATED ORAL at 10:54

## 2020-04-19 RX ADMIN — TRAMADOL HYDROCHLORIDE 50 MG: 50 TABLET, FILM COATED ORAL at 12:43

## 2020-04-19 RX ADMIN — DOXYCYCLINE 100 MG: 100 TABLET ORAL at 05:44

## 2020-04-19 RX ADMIN — AMOXICILLIN AND CLAVULANATE POTASSIUM 1 TABLET: 875; 125 TABLET, FILM COATED ORAL at 17:25

## 2020-04-19 RX ADMIN — ENOXAPARIN SODIUM 40 MG: 100 INJECTION SUBCUTANEOUS at 10:55

## 2020-04-19 ASSESSMENT — ENCOUNTER SYMPTOMS
PALPITATIONS: 0
ABDOMINAL PAIN: 0
BRUISES/BLEEDS EASILY: 0
NECK PAIN: 0
HEADACHES: 0
DIZZINESS: 0
COUGH: 0
DOUBLE VISION: 0
HEARTBURN: 0
BACK PAIN: 0
MYALGIAS: 0
NAUSEA: 0
FEVER: 0
VOMITING: 0
BLURRED VISION: 0
WEIGHT LOSS: 0
DEPRESSION: 0
HEMOPTYSIS: 0

## 2020-04-19 NOTE — CARE PLAN
Problem: Communication  Goal: The ability to communicate needs accurately and effectively will improve  Outcome: PROGRESSING AS EXPECTED   Pt encouraged to communicate regarding plan of care.   Problem: Pain Management  Goal: Pain level will decrease to patient's comfort goal  Outcome: PROGRESSING AS EXPECTED   Pain controlled with oral pain meds

## 2020-04-19 NOTE — WOUND TEAM
[facility]  Inpatient Services  Initial Wound and Skin Care Evaluation    Admission Date: 4/16/2020     Last order of IP CONSULT TO WOUND CARE was found on 4/18/2020 from Hospital Encounter on 4/16/2020     HPI, PMH, SH: Reviewed    Unit where seen by Wound Team: [id number]     WOUND CONSULT/FOLLOW UP RELATED TO:  I&D in ER, repeat I&D with Dr. Wilkerson 04/18     Self Report / Pain Level:  Pre-medicated with oral.  Tolerated well.        OBJECTIVE:  In bed, previous dressing intact.     WOUND TYPE, LOCATION, CHARACTERISTICS (Pressure Injuries: location, stage, POA or date identified)  Wound 04/18/20 Full Thickness Wound Axilla Right I&D site (Active)   Wound Image       4/19/2020  1:50 PM   Site Assessment Red;Pink    Periwound Assessment Intact    Margins Attached edges    Closure Secondary intention    Drainage Amount Small    Drainage Description Serosanguineous    Treatments Cleansed;Site care    Wound Cleansing Normal Saline Irrigation    Periwound Protectant Skin Protectant Wipes to Periwound    Dressing Cleansing/Solutions Not Applicable    Dressing Options Silver Strip Packing;Dry Gauze;Hypafix Tape    Dressing Changed New    Dressing Status Clean;Dry;Intact    Dressing Change/Treatment Frequency Daily, and As Needed    NEXT Dressing Change/Treatment Date 04/20/20    NEXT Weekly Photo (Inpatient Only) 04/26/20    Wound Length (cm) 0.5 cm    Wound Width (cm) 3 cm    Wound Depth (cm) 3 cm    Wound Surface Area (cm^2) 1.5 cm^2    Wound Volume (cm^3) 4.5 cm^3    WOUND NURSE ONLY - Time Spent with Patient (mins) 60    Number of days: 1      Vascular:    NINOSKA:   No results found.    Lab Values:    Lab Results   Component Value Date/Time    WBC 14.0 (H) 04/19/2020 06:45 AM    RBC 5.12 04/19/2020 06:45 AM    HEMOGLOBIN 14.9 04/19/2020 06:45 AM    HEMATOCRIT 44.4 04/19/2020 06:45 AM    CREACTPROT 1.00 (H) 04/16/2020 01:45 PM    SEDRATEWES 10 04/16/2020 01:45 PM    HBA1C 5.8 (H) 04/17/2020 03:06 AM     "  Culture:     Culture Results show:  Recent Results (from the past 720 hour(s))   CULTURE WOUND W/ GRAM STAIN    Collection Time: 04/16/20  1:32 PM   Result Value Ref Range    Significant Indicator POS (POS)     Source WND     Site ABSCESS     Culture Result Rare growth  mixed skin ava. (A)     Gram Stain Result Many WBCs.  Many Gram positive cocci.       Culture Result Peptostreptococcus sp.  Light growth   (A)        INTERVENTIONS BY WOUND TEAM:  Removed previous dressing, cleansed with NS and gauze.  New photo and measurement taken.  Filled with 1/4\" silver packing, covered with dry gauze and hypafix tape.      Interdisciplinary consultation: Patient, Bedside RN (Radames)     EVALUATION: per notes, OP wound care is plan for Dc.  Patient also states her daughter could help as needed.  Straight forward open cavity that should heel well with packing.     Goals: Steady decrease in wound area and depth weekly.    NURSING PLAN OF CARE ORDERS (X):    Dressing changes: See Dressing Care orders: X  Skin care: See Skin Care orders: moves self   Rectal tube care: See Rectal Tube Care orders:   Other orders:    RSKIN:   CURRENTLY IN PLACE (X), APPLIED THIS VISIT (A), ORDERED (O):   Q shift Kenneth:    Q shift pressure point assessments:    Pressure redistribution mattress          X  Low Airloss          Bariatric PHILLIP         Bariatric foam           Heel float boots     Heel Silicone dressing        Float Heels off Bed with Pillows               Barrier wipes         Barrier Cream         Barrier paste          Sacral silicone dressing         Silicone O2 tubing         Anchorfast         Cannula fixation Device (Tender )          Gray Foam Ear protectors           Trach with Optifoam split foam                 Waffle cushion        Waffle Overlay         Rectal tube or BMS    Purwick/Condom Cath          Antifungal tx      Interdry          Reposition q 2 hours        Up to chair        Ambulate      PT/OT      "   Dietician        Diabetes Education      PO  X TF     TPN     NPO   # days   Other        WOUND TEAM PLAN OF CARE:   Dressing changes by wound team:          Follow up 1-2 times weekly:               Follow up 3 times weekly:                NPWT change 3 times weekly:     Follow up as needed:     X  Other (explain):     Anticipated discharge plans:   LTACH:        SNF/Rehab:                   Home Care:           Outpatient Wound Center:          X  Self Care:

## 2020-04-19 NOTE — PROGRESS NOTES
[facility] Medicine Daily Progress Note    Date of Service  4/19/2020    Chief Complaint  44 y.o. female admitted 4/16/2020 with htn urgency      Interval Problem Update  Patient seen and evaluated   No complaints  Pain controlled  Feeling better  De escalate abx   Anticipate dc home tomorrow  Awaiting wound care arrangement outpatient and evaluation inpatient     Consultants/Specialty  Surgery    Code Status  Full code    Disposition  Anticipate home tomorrow  Outpatient wound care     Review of Systems  Review of Systems   Constitutional: Negative for fever and weight loss.   HENT: Negative for congestion and nosebleeds.    Eyes: Negative for blurred vision and double vision.   Respiratory: Negative for cough and hemoptysis.    Cardiovascular: Negative for chest pain and palpitations.   Gastrointestinal: Negative for abdominal pain, heartburn, nausea and vomiting.   Genitourinary: Negative for frequency and urgency.   Musculoskeletal: Negative for back pain, myalgias and neck pain.   Skin: Negative for rash.   Neurological: Negative for dizziness and headaches.   Endo/Heme/Allergies: Does not bruise/bleed easily.   Psychiatric/Behavioral: Negative for depression.        Physical Exam  Temp:  [36.9 °C (98.5 °F)-37.6 °C (99.6 °F)] 37.6 °C (99.6 °F)  Pulse:  [86-95] 86  Resp:  [16] 16  BP: (125-154)/() 127/86  SpO2:  [93 %-97 %] 93 %    Physical Exam  Vitals signs and nursing note reviewed.   Constitutional:       General: She is not in acute distress.     Appearance: Normal appearance.   HENT:      Head: Normocephalic and atraumatic.      Nose: Nose normal. No congestion or rhinorrhea.      Mouth/Throat:      Mouth: Mucous membranes are moist.   Eyes:      General:         Right eye: No discharge.         Left eye: No discharge.      Extraocular Movements: Extraocular movements intact.      Pupils: Pupils are equal, round, and reactive to light.   Neck:      Musculoskeletal: Normal range of motion and neck  supple. No neck rigidity or muscular tenderness.   Cardiovascular:      Rate and Rhythm: Normal rate and regular rhythm.      Pulses: Normal pulses.      Heart sounds: Normal heart sounds.   Pulmonary:      Effort: Pulmonary effort is normal. No respiratory distress.      Breath sounds: Normal breath sounds. No wheezing.   Abdominal:      General: Bowel sounds are normal. There is no distension.      Palpations: Abdomen is soft.      Tenderness: There is no abdominal tenderness. There is no guarding.   Musculoskeletal: Normal range of motion.         General: Tenderness present.      Comments: Right axillary tenderness, Dressing in place   Skin:     General: Skin is dry.      Capillary Refill: Capillary refill takes less than 2 seconds.   Neurological:      General: No focal deficit present.      Mental Status: She is alert and oriented to person, place, and time.      Cranial Nerves: No cranial nerve deficit.      Motor: No weakness.   Psychiatric:         Mood and Affect: Mood normal.         Behavior: Behavior normal.          Fluids    Intake/Output Summary (Last 24 hours) at 4/19/2020 0959  Last data filed at 4/19/2020 0300  Gross per 24 hour   Intake 580 ml   Output 400 ml   Net 180 ml       Laboratory  Recent Labs     04/17/20  0306 04/18/20  0309 04/19/20  0645   WBC 17.9* 17.9* 14.0*   RBC 5.03 5.24 5.12   HEMOGLOBIN 14.6 15.2 14.9   HEMATOCRIT 42.4 44.5 44.4   MCV 84.3 84.9 86.7   MCH 29.0 29.0 29.1   MCHC 34.4 34.2 33.6   RDW 39.9 41.1 41.6   PLATELETCT 275 300 259   MPV 10.1 10.0 10.0     Recent Labs     04/16/20  1349 04/17/20  0306 04/18/20  0309   SODIUM 137 138 137   POTASSIUM 3.3* 3.8 3.9   CHLORIDE 99 103 101   CO2 26 20 24   GLUCOSE 112* 148* 132*   BUN 13 10 10   CREATININE 0.71 0.64 0.57   CALCIUM 9.4 8.8 9.4             Recent Labs     04/17/20  0306   TRIGLYCERIDE 129   HDL 52   *       Imaging  EC-ECHOCARDIOGRAM COMPLETE W/O CONT   Final Result      DX-CHEST-PORTABLE (1 VIEW)   Final  Result      1.  Patchy bilateral interstitial and airspace opacities are suggestive of atypical/viral infection.      2.  Prominent soft tissue projecting over the right axilla may represent breast tissue.      3.  Faint radiopaque density projecting over the lateral right axilla may be external to the patient or represent faint calcification.           Assessment/Plan  * Hidradenitis- (present on admission)  Assessment & Plan  Repeated episodes of right axillary swelling  I&D performed in ER by ER physician  Which are growing Peptostreptococcus  Subsequent I&D by Dr. Wilkerson  De-escalate antibiotics to oral Augmentin or doxycycline  Awaiting wound team input, arrange outpatient wound care  Close clinical monitoring    Sepsis (HCC)- (present on admission)  Assessment & Plan  Resolved   Continue clinical monitoring     Abnormal finding of lung- (present on admission)  Assessment & Plan  COVID-19 negative   Asymptomatic at this time     Hypertensive urgency- (present on admission)  Assessment & Plan  Improved  Continue amlodipine and lisinopril   Titrate to achieve normotensive control       VTE prophylaxis: SC Lovenox

## 2020-04-19 NOTE — CARE PLAN
Problem: Safety  Goal: Will remain free from injury  Outcome: PROGRESSING AS EXPECTED  Intervention: Provide assistance with mobility  Note: Pt educated on use of call light. Fall precautions in place. Pt calling appropriately      Problem: Pain Management  Goal: Pain level will decrease to patient's comfort goal  Outcome: PROGRESSING AS EXPECTED  Intervention: Follow pain managment plan developed in collaboration with patient and Interdisciplinary Team  Note: Medicating pt appropriately with prescribed regimen. Pt reporting decreased pain and showing no signs of distress

## 2020-04-19 NOTE — PROGRESS NOTES
"Pt AA&Ox4. No c/o pain, n/t, n/v or SOB. Pt on RA. Tolerating regular diet. Right armpit dressing with scant drainage and intact. Redness and swelling noted. Dressing to be changed daily. Pt ambulates independently with steady gait. Bilateral FA PIV intact and saline locked. Plan of care discussed. Hourly rounding in place. Call light within reach. /86   Pulse 86   Temp 37.6 °C (99.6 °F) (Temporal)   Resp 16   Ht 1.6 m (5' 3\")   Wt 79.3 kg (174 lb 13.2 oz)   SpO2 93%     "

## 2020-04-20 ENCOUNTER — TELEPHONE (OUTPATIENT)
Dept: SCHEDULING | Facility: IMAGING CENTER | Age: 45
End: 2020-04-20

## 2020-04-20 VITALS
HEIGHT: 63 IN | BODY MASS INDEX: 30.98 KG/M2 | DIASTOLIC BLOOD PRESSURE: 100 MMHG | WEIGHT: 174.82 LBS | RESPIRATION RATE: 16 BRPM | OXYGEN SATURATION: 92 % | HEART RATE: 79 BPM | SYSTOLIC BLOOD PRESSURE: 137 MMHG | TEMPERATURE: 98.1 F

## 2020-04-20 PROBLEM — I16.0 HYPERTENSIVE URGENCY: Status: RESOLVED | Noted: 2020-04-16 | Resolved: 2020-04-20

## 2020-04-20 PROBLEM — A41.9 SEPSIS (HCC): Status: RESOLVED | Noted: 2020-04-17 | Resolved: 2020-04-20

## 2020-04-20 PROBLEM — R09.89 ABNORMAL FINDING OF LUNG: Status: RESOLVED | Noted: 2020-04-16 | Resolved: 2020-04-20

## 2020-04-20 LAB
ERYTHROCYTE [DISTWIDTH] IN BLOOD BY AUTOMATED COUNT: 39.3 FL (ref 35.9–50)
HCT VFR BLD AUTO: 43.1 % (ref 37–47)
HGB BLD-MCNC: 14.8 G/DL (ref 12–16)
MCH RBC QN AUTO: 29.1 PG (ref 27–33)
MCHC RBC AUTO-ENTMCNC: 34.3 G/DL (ref 33.6–35)
MCV RBC AUTO: 84.8 FL (ref 81.4–97.8)
PLATELET # BLD AUTO: 269 K/UL (ref 164–446)
PMV BLD AUTO: 9.9 FL (ref 9–12.9)
RBC # BLD AUTO: 5.08 M/UL (ref 4.2–5.4)
WBC # BLD AUTO: 13.9 K/UL (ref 4.8–10.8)

## 2020-04-20 PROCEDURE — 700102 HCHG RX REV CODE 250 W/ 637 OVERRIDE(OP): Performed by: HOSPITALIST

## 2020-04-20 PROCEDURE — 85027 COMPLETE CBC AUTOMATED: CPT

## 2020-04-20 PROCEDURE — 700111 HCHG RX REV CODE 636 W/ 250 OVERRIDE (IP): Performed by: INTERNAL MEDICINE

## 2020-04-20 PROCEDURE — 90471 IMMUNIZATION ADMIN: CPT

## 2020-04-20 PROCEDURE — A9270 NON-COVERED ITEM OR SERVICE: HCPCS | Performed by: HOSPITALIST

## 2020-04-20 PROCEDURE — 36415 COLL VENOUS BLD VENIPUNCTURE: CPT

## 2020-04-20 PROCEDURE — 99239 HOSP IP/OBS DSCHRG MGMT >30: CPT | Performed by: INTERNAL MEDICINE

## 2020-04-20 PROCEDURE — A9270 NON-COVERED ITEM OR SERVICE: HCPCS | Performed by: INTERNAL MEDICINE

## 2020-04-20 PROCEDURE — 3E02340 INTRODUCTION OF INFLUENZA VACCINE INTO MUSCLE, PERCUTANEOUS APPROACH: ICD-10-PCS | Performed by: INTERNAL MEDICINE

## 2020-04-20 PROCEDURE — 700111 HCHG RX REV CODE 636 W/ 250 OVERRIDE (IP): Performed by: HOSPITALIST

## 2020-04-20 PROCEDURE — 700102 HCHG RX REV CODE 250 W/ 637 OVERRIDE(OP): Performed by: INTERNAL MEDICINE

## 2020-04-20 PROCEDURE — 90686 IIV4 VACC NO PRSV 0.5 ML IM: CPT | Performed by: HOSPITALIST

## 2020-04-20 RX ORDER — DOXYCYCLINE 100 MG/1
100 TABLET ORAL EVERY 12 HOURS
Qty: 14 TAB | Refills: 0 | Status: SHIPPED | OUTPATIENT
Start: 2020-04-20 | End: 2020-04-27

## 2020-04-20 RX ORDER — LISINOPRIL 5 MG/1
5 TABLET ORAL DAILY
Qty: 30 TAB | Refills: 0 | Status: SHIPPED | OUTPATIENT
Start: 2020-04-21 | End: 2020-04-28 | Stop reason: SDUPTHER

## 2020-04-20 RX ORDER — AMLODIPINE BESYLATE 10 MG/1
10 TABLET ORAL DAILY
Qty: 30 TAB | Refills: 0 | Status: SHIPPED | OUTPATIENT
Start: 2020-04-21 | End: 2020-04-28 | Stop reason: SDUPTHER

## 2020-04-20 RX ORDER — AMOXICILLIN AND CLAVULANATE POTASSIUM 875; 125 MG/1; MG/1
1 TABLET, FILM COATED ORAL EVERY 12 HOURS
Qty: 14 TAB | Refills: 0 | Status: SHIPPED | OUTPATIENT
Start: 2020-04-20 | End: 2020-04-27

## 2020-04-20 RX ADMIN — INFLUENZA A VIRUS A/BRISBANE/02/2018 IVR-190 (H1N1) ANTIGEN (FORMALDEHYDE INACTIVATED), INFLUENZA A VIRUS A/KANSAS/14/2017 X-327 (H3N2) ANTIGEN (FORMALDEHYDE INACTIVATED), INFLUENZA B VIRUS B/PHUKET/3073/2013 ANTIGEN (FORMALDEHYDE INACTIVATED), AND INFLUENZA B VIRUS B/MARYLAND/15/2016 BX-69A ANTIGEN (FORMALDEHYDE INACTIVATED) 0.5 ML: 15; 15; 15; 15 INJECTION, SUSPENSION INTRAMUSCULAR at 05:36

## 2020-04-20 RX ADMIN — LISINOPRIL 5 MG: 5 TABLET ORAL at 05:32

## 2020-04-20 RX ADMIN — ENOXAPARIN SODIUM 40 MG: 100 INJECTION SUBCUTANEOUS at 05:32

## 2020-04-20 RX ADMIN — DOXYCYCLINE 100 MG: 100 TABLET ORAL at 05:32

## 2020-04-20 RX ADMIN — AMOXICILLIN AND CLAVULANATE POTASSIUM 1 TABLET: 875; 125 TABLET, FILM COATED ORAL at 05:32

## 2020-04-20 RX ADMIN — TRAMADOL HYDROCHLORIDE 50 MG: 50 TABLET, FILM COATED ORAL at 05:42

## 2020-04-20 RX ADMIN — AMLODIPINE BESYLATE 10 MG: 10 TABLET ORAL at 05:32

## 2020-04-20 NOTE — DISCHARGE INSTRUCTIONS
Discharge Instructions    Discharged to home by car with relative. Discharged via wheelchair, hospital escort: Yes.  Special equipment needed: Not Applicable    Be sure to schedule a follow-up appointment with your primary care doctor or any specialists as instructed.     Discharge Plan:   Diet Plan: Discussed  Activity Level: Discussed  Confirmed Follow up Appointment: Appointment Scheduled  Confirmed Symptoms Management: Discussed  Medication Reconciliation Updated: Yes  Influenza Vaccine Indication: Indicated: 9 to 64 years of age  Influenza Vaccine Given - only chart on this line when given: Influenza Vaccine Given (See MAR)    I understand that a diet low in cholesterol, fat, and sodium is recommended for good health. Unless I have been given specific instructions below for another diet, I accept this instruction as my diet prescription.   Other diet: Regular    Special Instructions: Change dressing daily.  Remove packing.  Cleanse wound with saline flush.  Fill wound with silver strip gauze packing. Wipe surrounding skin with skin prep pad.  Cover woundwith dry gauze and secure with hypafix tape.     · Is patient discharged on Warfarin / Coumadin?   No     Incision and Drainage, Care After  Refer to this sheet in the next few weeks. These instructions provide you with information on caring for yourself after your procedure. Your caregiver may also give you more specific instructions. Your treatment has been planned according to current medical practices, but problems sometimes occur. Call your caregiver if you have any problems or questions after your procedure.  HOME CARE INSTRUCTIONS   · If antibiotic medicine is given, take it as directed. Finish it even if you start to feel better.  · Only take over-the-counter or prescription medicines for pain, discomfort, or fever as directed by your caregiver.  · Keep all follow-up appointments as directed by your caregiver.  · Change any bandages (dressings) as  directed by your caregiver. Replace old dressings with clean dressings.  · Wash your hands before and after caring for your wound.  You will receive specific instructions for cleansing and caring for your wound.   SEEK MEDICAL CARE IF:   · You have increased pain, swelling, or redness around the wound.  · You have increased drainage, smell, or bleeding from the wound.  · You have muscle aches, chills, or you feel generally sick.  · You have a fever.  MAKE SURE YOU:   · Understand these instructions.  · Will watch your condition.  · Will get help right away if you are not doing well or get worse.     This information is not intended to replace advice given to you by your health care provider. Make sure you discuss any questions you have with your health care provider.     Document Released: 03/11/2013 Document Revised: 01/08/2016 Document Reviewed: 03/11/2013  [employer] Interactive Patient Education ©2016 [employer]        Depression / Suicide Risk    As you are discharged from this [facility] facility, it is important to learn how to keep safe from harming yourself.    Recognize the warning signs:  · Abrupt changes in personality, positive or negative- including increase in energy   · Giving away possessions  · Change in eating patterns- significant weight changes-  positive or negative  · Change in sleeping patterns- unable to sleep or sleeping all the time   · Unwillingness or inability to communicate  · Depression  · Unusual sadness, discouragement and loneliness  · Talk of wanting to die  · Neglect of personal appearance   · Rebelliousness- reckless behavior  · Withdrawal from people/activities they love  · Confusion- inability to concentrate     If you or a loved one observes any of these behaviors or has concerns about self-harm, here's what you can do:  · Talk about it- your feelings and reasons for harming yourself  · Remove any means that you might use to hurt yourself (examples: pills, rope, extension  cords, firearm)  · Get professional help from the community (Mental Health, Substance Abuse, psychological counseling)  · Do not be alone:Call your Safe Contact- someone whom you trust who will be there for you.  · Call your local CRISIS HOTLINE 529-1322 or 777-008-5462  · Call your local Children's Mobile Crisis Response Team [place] (040) 731-8502 or www.[employer]  · Call the toll free National Suicide Prevention Hotlines   · National Suicide Prevention Lifeline 837-768-KUHB (4309)  · National Hope Line Network 800-SUICIDE (634-1919)

## 2020-04-20 NOTE — DISCHARGE SUMMARY
Discharge Summary    CHIEF COMPLAINT ON ADMISSION  Chief Complaint   Patient presents with   • Lump     R arm pit for 2 years, painful now       Reason for Admission  Breast Pain     Admission Date  4/16/2020    CODE STATUS  Full Code    HPI & HOSPITAL COURSE  This is a 44 y.o. female here with Lump, pain in her right armpit.  Patient presented to ED emergency department with this, there was findings of hidradenitis with underlying abscess drained by ERP with findings of hypertensive urgency and concerns for COVID 19 infection.  COVID 19 infection was clinically ruled out during the hospitalization.  Patient was admitted to the hospital, appropriate antibiotic therapy and wound care was continued for right axillary abscess, general surgical consultation was obtained and patient was evaluated by Dr. Rei Aiken and further irrigation and debridement was performed and subsequent wound care continued.  Patient de-escalate to oral Augmentin and doxycycline therapy, this has been further prescribed for 7 more days following discharge.  Patient has been educated regarding self wound care, to maintain outpatient wound care follow-up.  Discussed with /case management will arrange for this.  Findings of hypertension, lisinopril and amlodipine initiated and prescribed.  Advise close outpatient follow-up with PCP for ongoing titration of hypertensive therapy, interval BMP in 1 week with PCP.  Interval CBC in 1 week with PCP, if persistent and non-resolving leukocytosis, further work-up with PCP.  No other acute concerns at this time, patient is very eager to discharge home.  We discharged home in stable condition with recommendations to maintain close outpatient follow-up.           Therefore, she is discharged in good and stable condition to home with close outpatient follow-up.    The patient met 2-midnight criteria for an inpatient stay at the time of discharge.    Discharge Date  04/20/20    FOLLOW UP ITEMS  POST DISCHARGE  F/U wound care team, PCP, surgical team as needed    DISCHARGE DIAGNOSES  Principal Problem:    Hidradenitis POA: Yes  Resolved Problems:    Hypertensive urgency POA: Yes    Abnormal finding of lung POA: Yes    Sepsis (HCC) POA: Yes      FOLLOW UP  Future Appointments   Date Time Provider Department Center   4/21/2020  2:20 PM Marily Brandt M.D. [facility]   4/23/2020  9:00 AM Cynthia Lopez R.N. [address]     No follow-up provider specified.    MEDICATIONS ON DISCHARGE     Medication List      START taking these medications      Instructions   amLODIPine 10 MG Tabs  Start taking on:  April 21, 2020  Commonly known as:  NORVASC   Take 1 Tab by mouth every day.  Dose:  10 mg     amoxicillin-clavulanate 875-125 MG Tabs  Commonly known as:  AUGMENTIN   Take 1 Tab by mouth every 12 hours for 7 days.  Dose:  1 Tab     doxycycline monohydrate 100 MG tablet  Commonly known as:  ADOXA   Take 1 Tab by mouth every 12 hours for 7 days.  Dose:  100 mg     lisinopril 5 MG Tabs  Start taking on:  April 21, 2020  Commonly known as:  PRINIVIL   Take 1 Tab by mouth every day.  Dose:  5 mg            Allergies  No Known Allergies    DIET  Orders Placed This Encounter   Procedures   • Diet Order Regular     Standing Status:   Standing     Number of Occurrences:   1     Order Specific Question:   Diet:     Answer:   Regular [1]       ACTIVITY  As tolerated.  Weight bearing as tolerated    CONSULTATIONS  General surgery     PROCEDURES  As noted     LABORATORY  Lab Results   Component Value Date    SODIUM 137 04/18/2020    POTASSIUM 3.9 04/18/2020    CHLORIDE 101 04/18/2020    CO2 24 04/18/2020    GLUCOSE 132 (H) 04/18/2020    BUN 10 04/18/2020    CREATININE 0.57 04/18/2020        Lab Results   Component Value Date    WBC 13.9 (H) 04/20/2020    HEMOGLOBIN 14.8 04/20/2020    HEMATOCRIT 43.1 04/20/2020    PLATELETCT 269 04/20/2020        Total time of the discharge process exceeds 33 minutes.

## 2020-04-20 NOTE — DISCHARGE PLANNING
Anticipated Discharge Disposition: Home with Outpatient Wound Care & PCP Follow Up.    Action: Per MD, this patient is discharging home today and requires outpatient wound care.  LSW spoke with Katarina at the [facility].  Per Katarina, the appointment is scheduled for Thursday April 23, 2020 at 8:45am, LA Wong notified via phone.    LA Wong requested assistance scheduling a appointment for new PCP Follow Up.  LSW spoke with Ryan Ballard .  Per Nellie, the appointment is scheduled for tomorrow April 21, 2020 at 2:00pm with Dr. Carmen Brandt RN notified.    Barriers to Discharge: None    Plan: As Above.

## 2020-04-20 NOTE — PROGRESS NOTES
D/C instructions reviewed with patient and her sister in law over the phone.  Follow up appointments reviewed as well as wound care instructions.  Patient educated on s/s of infection to watch out for.  PIV removed.  All questions answered.  Patient D/C with all belongings and 1 weeks worth of wound care supplies.

## 2020-04-20 NOTE — PROGRESS NOTES
Bedside report received.  Assessment complete.  A&O x 4. RA. Patient calls appropriately.  Patient ambulates with no assist. Bed alarm NA.   Patient has 0/10 pain. Pain managed with prescribed medications.  Denies N&V. Tolerating regular diet.  Incision to right axilla noted. Puffiness and swelling around site. Dressing CDI  + void, + flatus, + BM.  Patient denies SOB.  Review plan with of care with patient. Call light and personal belongings with in reach. Hourly rounding in place. All needs met at this time.

## 2020-04-20 NOTE — CARE PLAN
Problem: Communication  Goal: The ability to communicate needs accurately and effectively will improve  Outcome: PROGRESSING AS EXPECTED     Problem: Safety  Goal: Will remain free from injury  Outcome: PROGRESSING AS EXPECTED     Problem: Bowel/Gastric:  Goal: Normal bowel function is maintained or improved  Outcome: PROGRESSING AS EXPECTED     Problem: Knowledge Deficit  Goal: Knowledge of disease process/condition, treatment plan, diagnostic tests, and medications will improve  Outcome: PROGRESSING AS EXPECTED     Problem: Discharge Barriers/Planning  Goal: Patient's continuum of care needs will be met  Outcome: PROGRESSING AS EXPECTED     Problem: Pain Management  Goal: Pain level will decrease to patient's comfort goal  Outcome: PROGRESSING AS EXPECTED      Pres[place] per [facility] [place].    Symone Paiz RN on 8/23/2019 at 8:45 AM

## 2020-04-21 LAB
BACTERIA BLD CULT: NORMAL
BACTERIA BLD CULT: NORMAL
SIGNIFICANT IND 70042: NORMAL
SIGNIFICANT IND 70042: NORMAL
SITE SITE: NORMAL
SITE SITE: NORMAL
SOURCE SOURCE: NORMAL
SOURCE SOURCE: NORMAL

## 2020-04-23 ENCOUNTER — NON-PROVIDER VISIT (OUTPATIENT)
Dept: WOUND CARE | Facility: MEDICAL CENTER | Age: 45
End: 2020-04-23
Attending: INTERNAL MEDICINE
Payer: COMMERCIAL

## 2020-04-23 PROCEDURE — 97597 DBRDMT OPN WND 1ST 20 CM/<: CPT

## 2020-04-23 PROCEDURE — 97602 WOUND(S) CARE NON-SELECTIVE: CPT

## 2020-04-23 PROCEDURE — 99211 OFF/OP EST MAY X REQ PHY/QHP: CPT

## 2020-04-23 SDOH — HEALTH STABILITY: MENTAL HEALTH: HOW OFTEN DO YOU HAVE A DRINK CONTAINING ALCOHOL?: NEVER

## 2020-04-23 NOTE — PATIENT INSTRUCTIONS
Should you experience any significant changes in your wound(s) such as infection (redness, swelling, localized heat, increased pain, fever >101 F, chills) or have any questions regarding your home care instructions, please contact the wound center [phone]. If after hours, contact your primary care physician or go the hospital emergency room.  Keep dressing clean and dry and cover while bathing. Only change dressing if over saturated, soiled or its falling off.

## 2020-04-23 NOTE — CERTIFICATION
Non Provider Encounter- Full Thickness wound    HISTORY OF PRESENT ILLNESS  Wound History:    START OF CARE IN CLINIC: 04/23/2020    REFERRING PROVIDER: Sven Ahmadi MD   WOUND- Full Thickness Wound   LOCATION: Right axilla   HISTORY: Patient is a 44 year old female who presents today with a wound to her right axilla. She presented to the ED on 4/16/2020 with a lump in her right axilla that she states has been present for about two years and it recently started to give her pain. She was found to have hidradenitis with underlying abscess. The ERP did an I&D on 4/16/2020 and then Dr. Aiken did a follow up I&D on 4/18/2020. The patient has been having her niece change the dressing daily at home.     Pertinent Labs and Diagnostics:    Labs:   A1c:   Lab Results   Component Value Date/Time    HBA1C 5.8 (H) 04/17/2020 03:06 AM      IMAGING: Chest xray 4/16/2020    VASCULAR STUDIES: N/A    LAST  WOUND CULTURE:  DATE : +Peptostreptococcus light growth           FALL RISK ASSESSMENT: Patient is not a fall risk   65 years or older     Fall within the last 2 years   Uses ambulatory devices  Loss of protective sensation in feet   Use of prostethic/orthotic    Presence of lower extremity/foot/toe amputation   xTaking medication that increases risk (per facility policy)    PAST MEDICAL HISTORY:   Past Medical History:   Diagnosis Date   • Hypertension    • Pneumonia      PAST SURGICAL HISTORY: No past surgical history on file.     MEDICATIONS:   Current Outpatient Medications   Medication   • amLODIPine (NORVASC) 10 MG Tab   • amoxicillin-clavulanate (AUGMENTIN) 875-125 MG Tab   • doxycycline monohydrate (ADOXA) 100 MG tablet   • lisinopril (PRINIVIL) 5 MG Tab     No current facility-administered medications for this visit.      ALLERGIES:  No Known Allergies     SOCIAL HISTORY:   Social History     Socioeconomic History   • Marital status:      Spouse name: Not on file   • Number of children: Not on file   • Years of  education: Not on file   • Highest education level: Not on file   Occupational History   • Not on file   Social Needs   • Financial resource strain: Not on file   • Food insecurity     Worry: Not on file     Inability: Not on file   • Transportation needs     Medical: Not on file     Non-medical: Not on file   Tobacco Use   • Smoking status: Never Smoker   • Smokeless tobacco: Never Used   Substance and Sexual Activity   • Alcohol use: Not on file   • Drug use: Not on file   • Sexual activity: Not on file   Lifestyle   • Physical activity     Days per week: Not on file     Minutes per session: Not on file   • Stress: Not on file   Relationships   • Social connections     Talks on phone: Not on file     Gets together: Not on file     Attends Manda[employer] service: Not on file     Active member of club or organization: Not on file     Attends meetings of clubs or organizations: Not on file     Relationship status: Not on file   • Intimate partner violence     Fear of current or ex partner: Not on file     Emotionally abused: Not on file     Physically abused: Not on file     Forced sexual activity: Not on file   Other Topics Concern   • Not on file   Social History Narrative   • Not on file     Wound Assessment:  Wound 04/23/20 Axilla Lateral Right axilla (Active)   Wound Image    4/23/2020  9:00 AM   Site Assessment Red;Pink 4/23/2020  9:00 AM   Periwound Assessment Induration 4/23/2020  9:00 AM   Margins Attached edges 4/23/2020  9:00 AM   Drainage Amount Moderate 4/23/2020  9:00 AM   Drainage Description Serosanguineous 4/23/2020  9:00 AM   Treatments Cleansed 4/23/2020  9:00 AM   Wound Cleansing Normal Saline Irrigation 4/23/2020  9:00 AM   Periwound Protectant Skin Protectant Wipes to Periwound 4/23/2020  9:00 AM   Dressing Cleansing/Solutions Not Applicable 4/23/2020  9:00 AM   Dressing Options Hydrofiber Silver;Silicone Adhesive Foam 4/23/2020  9:00 AM   Dressing Changed New 4/23/2020  9:00 AM   Dressing Status  Clean;Dry;Intact 4/23/2020  9:00 AM   Dressing Change/Treatment Frequency Every 72 hrs, and As Needed 4/23/2020  9:00 AM   Non-staged Wound Description Full thickness 4/23/2020  9:00 AM   Post-Procedure Length (cm) 1.2 cm 4/23/2020  9:00 AM   Post-Procedure Width (cm) 0.2 cm 4/23/2020  9:00 AM   Post-Procedure Depth (cm) 1.5 cm 4/23/2020  9:00 AM   Post-Procedure Surface Area (cm^2) 0.24 cm^2 4/23/2020  9:00 AM   Post-Procedure Volume (cm^3) 0.36 cm^3 4/23/2020  9:00 AM   Tunneling (cm) 1.8 cm 4/23/2020  9:00 AM   Undermining (cm) 0 cm 4/23/2020  9:00 AM   Wound Odor None 4/23/2020  9:00 AM   Pulses N/A 4/23/2020  9:00 AM   Exposed Structures None 4/23/2020  9:00 AM     Procedures:    -2% viscous lidocaine applied topically to wound bed for approximately 5 minutes prior to debridement  -Non selective blunt debridement with NS and cotton tipped applicator to remove non viable tissue from wound bed. Patient tolerated well.   -Refer to flowsheet for wound care details.     Post-debridement Photo            PATIENT EDUCATION  -Advised to go to ER for any increased redness, swelling, drainage or odor, or if patient develops fever, chills, nausea or vomiting.  -Importance of adequate nutrition for wound healing  -Increase protein intake (unless contraindicated by renal status)

## 2020-04-28 ENCOUNTER — OFFICE VISIT (OUTPATIENT)
Dept: MEDICAL GROUP | Facility: MEDICAL CENTER | Age: 45
End: 2020-04-28
Payer: COMMERCIAL

## 2020-04-28 VITALS
HEART RATE: 91 BPM | OXYGEN SATURATION: 95 % | HEIGHT: 63 IN | WEIGHT: 175.71 LBS | BODY MASS INDEX: 31.13 KG/M2 | SYSTOLIC BLOOD PRESSURE: 132 MMHG | RESPIRATION RATE: 14 BRPM | DIASTOLIC BLOOD PRESSURE: 88 MMHG | TEMPERATURE: 98.2 F

## 2020-04-28 DIAGNOSIS — I10 ESSENTIAL HYPERTENSION: ICD-10-CM

## 2020-04-28 DIAGNOSIS — L73.2 HIDRADENITIS: ICD-10-CM

## 2020-04-28 DIAGNOSIS — E66.09 CLASS 1 OBESITY DUE TO EXCESS CALORIES WITH SERIOUS COMORBIDITY AND BODY MASS INDEX (BMI) OF 31.0 TO 31.9 IN ADULT: ICD-10-CM

## 2020-04-28 PROBLEM — E66.811 CLASS 1 OBESITY DUE TO EXCESS CALORIES WITH SERIOUS COMORBIDITY IN ADULT: Status: ACTIVE | Noted: 2020-04-28

## 2020-04-28 PROCEDURE — 99204 OFFICE O/P NEW MOD 45 MIN: CPT | Performed by: FAMILY MEDICINE

## 2020-04-28 RX ORDER — AMLODIPINE BESYLATE 10 MG/1
10 TABLET ORAL DAILY
Qty: 90 TAB | Refills: 1 | Status: SHIPPED | OUTPATIENT
Start: 2020-04-28 | End: 2020-08-24 | Stop reason: SDUPTHER

## 2020-04-28 RX ORDER — LISINOPRIL 5 MG/1
5 TABLET ORAL DAILY
Qty: 90 TAB | Refills: 1 | Status: SHIPPED | OUTPATIENT
Start: 2020-04-28 | End: 2020-05-20

## 2020-04-28 ASSESSMENT — PATIENT HEALTH QUESTIONNAIRE - PHQ9: CLINICAL INTERPRETATION OF PHQ2 SCORE: 0

## 2020-04-28 ASSESSMENT — FIBROSIS 4 INDEX: FIB4 SCORE: 0.46

## 2020-04-28 NOTE — LETTER
[employer]  Marily Brandt M.D.  [address]  [place] [address]  Fax: [phone]   Authorization for Release/Disclosure of   Protected Health Information   Name: VAMSI TREVIÑO : 1975 SSN: [id number]   Address: [facility] [address]  [place] [address] Phone:    [phone] (home)    I authorize the entity listed below to release/disclose the PHI below to:   [facility]/Marily Brandt M.D. and Marily Brandt M.D.   Provider or Entity Name:     Address   City, State, Zip   Phone:      Fax:     Reason for request: continuity of care   Information to be released:    [  ] LAST COLONOSCOPY,  including any PATH REPORT and follow-up  [  ] LAST FIT/COLOGUARD RESULT [  ] LAST DEXA  [  ] LAST MAMMOGRAM  [  ] LAST PAP  [  ] LAST LABS [  ] RETINA EXAM REPORT  [  ] IMMUNIZATION RECORDS  [  ] Release all info      [  ] Check here and initial the line next to each item to release ALL health information INCLUDING  _____ Care and treatment for drug and / or alcohol abuse  _____ HIV testing, infection status, or AIDS  _____ Genetic Testing    DATES OF SERVICE OR TIME PERIOD TO BE DISCLOSED: _____________  I understand and acknowledge that:  * This Authorization may be revoked at any time by you in writing, except if your health information has already been used or disclosed.  * Your health information that will be used or disclosed as a result of you signing this authorization could be re-disclosed by the recipient. If this occurs, your re-disclosed health information may no longer be protected by State or Federal laws.  * You may refuse to sign this Authorization. Your refusal will not affect your ability to obtain treatment.  * This Authorization becomes effective upon signing and will  on (date) __________.      If no date is indicated, this Authorization will  one (1) year from the signature date.    Name: Vamsi Treviño    Signature:   Date:     2020            PLEASE FAX REQUESTED RECORDS BACK TO: [phone]

## 2020-04-28 NOTE — PROGRESS NOTES
Subjective:     CC:  Diagnoses of Essential hypertension, Hidradenitis, BMI 31.0-31.9,adult, and Class 1 obesity due to excess calories with serious comorbidity and body mass index (BMI) of 31.0 to 31.9 in adult were pertinent to this visit.    HISTORY OF THE PRESENT ILLNESS: Patient is a 44 y.o. female.  is here today to establish care and discuss the following:  Prior PCP: None.    Essential hypertension  This is a new problem. The patient was recently admitted to [facility] from 4/16/2020 - 4/20/2020 for hypertensive urgency and right axilla abscess.  The patient was discharged on amlodipine 10 mg daily and lisinopril 5 mg daily.  She has been taking home blood pressures and reports her blood pressure has been 120s-130s/70s-80s. The patient denies chest pain, shortness of breath, headaches, vision changes, lightheadedness, dizziness, or medication side effects.      Hidradenitis  The patient was admitted to [facility] about 2 weeks ago per above for HTN and hidradenitis with abscess of the left axilla. I&D was completed by the ERP with subsequent I&D by Dr. Wilkerson. She was treated with unasyn during admission and de-escalated to a 7 day course of doxycycline and Augmentin.  The patient is now following with wound care. She denies pain, erythema, or drainage.     The patient reports history of an abscess in 2014 which was surgically removed in the [facility].  She reports she was relatively asymptomatic since that time until her most recent episode.    Class 1 obesity due to excess calories with serious comorbidity in adult  This is a chronic problem.  The patient reports she eats a significant amount of rice and minimal vegetables.  She is trying to increase her exercise and has a stationary bike at home.      Allergies: Patient has no known allergies.    Current Outpatient Medications Ordered in Epic   Medication Sig Dispense Refill   • lisinopril (PRINIVIL) 5 MG Tab Take 1 Tab by mouth every day. 90  "Tab 1   • amLODIPine (NORVASC) 10 MG Tab Take 1 Tab by mouth every day. 90 Tab 1     No current Epic-ordered facility-administered medications on file.        Past Medical History:   Diagnosis Date   • Hypertension    • Pneumonia        History reviewed. No pertinent surgical history.    Social History     Tobacco Use   • Smoking status: Never Smoker   • Smokeless tobacco: Never Used   Substance Use Topics   • Alcohol use: Never     Frequency: Never   • Drug use: Never       Social History     Social History Narrative   • Not on file       Family History   Problem Relation Age of Onset   • No Known Problems Mother    • Hypertension Father        ROS:   Gen: no fevers/chills, no changes in weight  Eyes: no changes in vision  ENT: no sore throat or nasal congestion  Pulm: no sob, no cough  CV: no chest pain  GI: no nausea/vomiting, no diarrhea or constipation  : no dysuria  MSk: no myalgias  Skin: healing right axillary abscess s/p I&D  Neuro: no headaches, no numbness/tingling  Immuno: no seasonal allergies  Heme/Lymph: no easy bruising  Psych: no anxiety of depression      Objective:       Exam: /88 (BP Location: Right arm, Patient Position: Sitting, BP Cuff Size: Adult)   Pulse 91   Temp 36.8 °C (98.2 °F) (Temporal)   Resp 14   Ht 1.6 m (5' 3\")   Wt 79.7 kg (175 lb 11.3 oz)   SpO2 95%  Body mass index is 31.13 kg/m².    General: Well-developed, well-nourished. Appears stated age.  Eyes: Normocephalic. Conjunctiva clear without injection or scleral icterus. Pupils equal and reactive to light.   HENT: Normocephalic, atraumatic. Ears normal shape and contour, canals are clear bilaterally, tympanic membranes pearly, oropharynx is clear without erythema, edema or exudates.   Neck: Supple; no obvious thyromegaly or nodules appreciated  Pulmonary: Clear to ausculation bilaterally.  No increased work of breathing. No rales, ronchi, or wheezing.  Cardiovascular: Regular rate and rhythm without " murmur.  Abdomen: Soft, nontender, nondistended. Normal bowel sounds. No guarding or rebound tenderness.  Neurologic: CN III-XII intact.  Lymph: No cervical or supraclavicular lymphadenopathy palpated.  Skin: Right axillary incision clean, dry, with no surrounding erythema; no drainage  Musculoskeletal: Normal gait. No extremity cyanosis, clubbing, or edema.  Psych: Euthymic affect. Alert and oriented x 3. Judgment and insight is normal.      Assessment & Plan:   44 y.o. female with the following -    1. Essential hypertension  This is a new problem.  The patient was recently admitted to [facility] for hypertensive urgency and right axillary abscess.  She was discharged on lisinopril 5 mg daily and amlodipine 10 mg daily.  She reports her home blood pressures have been at goal.  -Continue current medication regimen  -Discussed importance of diet, exercise, and weight loss  - Comp Metabolic Panel; Future  - HEMOGLOBIN A1C; Future  - Lipid Profile; Future  - CBC WITH DIFFERENTIAL; Future  - MICROALBUMIN CREAT RATIO URINE; Future  - lisinopril (PRINIVIL) 5 MG Tab; Take 1 Tab by mouth every day.  Dispense: 90 Tab; Refill: 1  - amLODIPine (NORVASC) 10 MG Tab; Take 1 Tab by mouth every day.  Dispense: 90 Tab; Refill: 1    2. Hidradenitis  This is a chronic problem.  The patient was admitted for right axillary abscess 4/16/2020-4/20/2020.  She was treated with I&D X 2, IV antibiotics and transition to a 7-day course of Augmentin and doxycycline which she reports she finished.  She is following with wound care.  - Discussed possible daily oral antibiotic treatment given severity  - Will discuss possible long term treatment options with patient at her follow up visit in 3-4 weeks      3. Class 1 obesity due to excess calories with serious comorbidity and body mass index (BMI) of 31.0 to 31.9 in adult  This is a chronic problem.  Patient is currently eating significant amount of white rice.  Recommended the patient  increase her refined carbohydrate intake and increase vegetable intake.  Also recommended at least 150 minutes of moderate exercise weekly.      Return in about 1 month (around 5/28/2020).    Please note this dictation was created using voice recognition software. I have made every reasonable attempt to correct obvious errors, but I expect there may be errors of grammar, and possibly content, that I did not discover before finalizing the note.

## 2020-04-30 ENCOUNTER — OFFICE VISIT (OUTPATIENT)
Dept: WOUND CARE | Facility: MEDICAL CENTER | Age: 45
End: 2020-04-30
Attending: INTERNAL MEDICINE
Payer: COMMERCIAL

## 2020-04-30 ENCOUNTER — HOSPITAL ENCOUNTER (OUTPATIENT)
Facility: MEDICAL CENTER | Age: 45
End: 2020-04-30
Attending: NURSE PRACTITIONER
Payer: COMMERCIAL

## 2020-04-30 VITALS
TEMPERATURE: 97.6 F | OXYGEN SATURATION: 96 % | DIASTOLIC BLOOD PRESSURE: 94 MMHG | HEART RATE: 85 BPM | RESPIRATION RATE: 18 BRPM | SYSTOLIC BLOOD PRESSURE: 132 MMHG

## 2020-04-30 DIAGNOSIS — L08.9 WOUND INFECTION: ICD-10-CM

## 2020-04-30 DIAGNOSIS — T14.8XXA WOUND INFECTION: ICD-10-CM

## 2020-04-30 DIAGNOSIS — T14.8XXA PAIN ASSOCIATED WITH WOUND: ICD-10-CM

## 2020-04-30 DIAGNOSIS — T14.8XXA OPEN WOUND: ICD-10-CM

## 2020-04-30 DIAGNOSIS — R52 PAIN ASSOCIATED WITH WOUND: ICD-10-CM

## 2020-04-30 DIAGNOSIS — L08.9 WOUND INFECTION: Primary | ICD-10-CM

## 2020-04-30 DIAGNOSIS — T14.8XXA WOUND INFECTION: Primary | ICD-10-CM

## 2020-04-30 LAB
GRAM STN SPEC: NORMAL
SIGNIFICANT IND 70042: NORMAL
SITE SITE: NORMAL
SOURCE SOURCE: NORMAL

## 2020-04-30 PROCEDURE — 87070 CULTURE OTHR SPECIMN AEROBIC: CPT

## 2020-04-30 PROCEDURE — 99213 OFFICE O/P EST LOW 20 MIN: CPT | Performed by: NURSE PRACTITIONER

## 2020-04-30 PROCEDURE — 87205 SMEAR GRAM STAIN: CPT

## 2020-04-30 PROCEDURE — 99213 OFFICE O/P EST LOW 20 MIN: CPT

## 2020-04-30 ASSESSMENT — ENCOUNTER SYMPTOMS
PALPITATIONS: 0
DIZZINESS: 0
NAUSEA: 0
DIARRHEA: 0
HEADACHES: 0
WEAKNESS: 0
CONSTIPATION: 0
CHILLS: 0
FEVER: 0
VOMITING: 0

## 2020-04-30 NOTE — PROGRESS NOTES
Provider Encounter- Full Thickness wound    HISTORY OF PRESENT ILLNESS  Wound History:    START OF CARE IN CLINIC: 4/23/2020    REFERRING PROVIDER: Dr. Ahmadi     WOUND- Full Thickness Wound   LOCATION: Right armpit   HISTORY: Patient presented to [facility] on 4/16/2020 with pain to her right armpit.  She was found to have hidradenitis with underlying abscess.  The ER physician performed an I&D which was subsequently followed up by an additional I&D by Dr. Wilkerson on 4/18/2020.  Patient was initiated on IV antibiotics and switched over to oral Augmentin and doxycycline with continuation of this course for 7 more days post discharge.  Patient was discharged from the hospital with a referral to [facility] for wound care treatment and management.    Pertinent Medical History: Hydradenitis, hypertension, obesity    TOBACCO USE: Patient denies smoking or using smokeless tobacco    Patient's problem list, allergies, and current medications reviewed and updated in Epic    Interval History:  4/30/2020: Initial clinic visit visit with BRADEN Wade. Patient states that they are feeling well today.  Patient denies fever, chills, nausea, vomiting, lightheadedness, dizziness, shortness of breath and chest pain.  Patient with significant induration to right arm.  Patient had some purulent drainage expressed from wound.  Therefore I will order an ultrasound to rule out any recurrence of abscess.  Culture sent to rule out any bacterial infection.        REVIEW OF SYSTEMS:   Review of Systems   Constitutional: Negative for chills and fever.   Cardiovascular: Negative for chest pain, palpitations and leg swelling.   Gastrointestinal: Negative for constipation, diarrhea, nausea and vomiting.   Skin: Negative for itching and rash.   Neurological: Negative for dizziness, weakness and headaches.       PHYSICAL EXAMINATION:   [facility] 04/25/2020     Physical Exam   Constitutional: She is oriented to person, place, and  time and well-developed, well-nourished, and in no distress.   HENT:   Head: Normocephalic and atraumatic.   Eyes: Pupils are equal, round, and reactive to light.   Cardiovascular: Intact distal pulses.   Pulmonary/Chest: Effort normal. No respiratory distress. She has no wheezes.   Musculoskeletal:         General: Edema present.      Comments: Hard induration to right axillary region   Neurological: She is alert and oriented to person, place, and time.   Skin: Skin is warm and dry. No rash noted. No erythema.   Small open wound to right axilla  Large area of induration  See doc flowsheets   Psychiatric: Mood and affect normal.       WOUND ASSESSMENT     Wound 04/23/20 Axilla Lateral Right axilla (Active)   Wound Image    4/30/2020  1:00 PM   Site Assessment Pink;Red;Yellow 4/30/2020  1:00 PM   Periwound Assessment Dry;Intact;Induration 4/30/2020  1:00 PM   Margins Attached edges 4/30/2020  1:00 PM   Closure Secondary intention 4/30/2020  1:00 PM   Drainage Amount Moderate 4/30/2020  1:00 PM   Drainage Description Serosanguineous 4/30/2020  1:00 PM   Treatments Cleansed;Topical Lidocaine;Site care 4/30/2020  1:00 PM   Wound Cleansing Normal Saline Irrigation 4/30/2020  1:00 PM   Periwound Protectant Skin Protectant Wipes to Periwound;Barrier Paste 4/30/2020  1:00 PM   Dressing Cleansing/Solutions Not Applicable 4/30/2020  1:00 PM   Dressing Options Hydrofiber Silver Strip;Hydrofiber Silver;Silicone Adhesive Foam 4/30/2020  1:00 PM   Dressing Changed Changed 4/30/2020  1:00 PM   Dressing Status Clean;Dry;Intact 4/30/2020  1:00 PM   Dressing Change/Treatment Frequency Every 72 hrs, and As Needed 4/30/2020  1:00 PM   Non-staged Wound Description Full thickness 4/30/2020  1:00 PM   Wound Length (cm) 1.4 cm 4/30/2020  1:00 PM   Wound Width (cm) 0.2 cm 4/30/2020  1:00 PM   Wound Depth (cm) 0.4 cm 4/30/2020  1:00 PM   Wound Surface Area (cm^2) 0.28 cm^2 4/30/2020  1:00 PM   Wound Volume (cm^3) 0.11 cm^3 4/30/2020  1:00 PM    Post-Procedure Length (cm) 1.4 cm 4/30/2020  1:00 PM   Post-Procedure Width (cm) 0.2 cm 4/30/2020  1:00 PM   Post-Procedure Depth (cm) 1 cm 4/30/2020  1:00 PM   Post-Procedure Surface Area (cm^2) 0.28 cm^2 4/30/2020  1:00 PM   Post-Procedure Volume (cm^3) 0.28 cm^3 4/30/2020  1:00 PM   Tunneling (cm) 0 cm 4/30/2020  1:00 PM   Undermining (cm) 0 cm 4/30/2020  1:00 PM   Wound Odor None 4/30/2020  1:00 PM   Pulses N/A 4/23/2020  9:00 AM   Exposed Structures None 4/30/2020  1:00 PM            PROCEDURE:   No excisional debridement was performed in clinic today.  Wound area was cleansed and purulent drainage was expressed from small wound opening.  Drainage was sent for culture.  Ultrasound ordered to rule out new formation of abscess.      Pertinent Labs and Diagnostics:    Labs:     A1c:   Lab Results   Component Value Date/Time    HBA1C 5.8 (H) 04/17/2020 03:06 AM          IMAGING: N/A    VASCULAR STUDIES: N/A    LAST  WOUND CULTURE:  DATE : Positive wound culture 4/16/2020 abscess      Component 2wk ago   Significant Indicator POSPositive (POS)    Source WND    Site ABSCESS    Culture Result Rare growth  mixed skin ava.Abnormal     Gram Stain Result Many WBCs.   Many Gram positive cocci.    Culture Result Abnormal    Peptostreptococcus sp.   Light growth                     ASSESSMENT AND PLAN:     1. Open wound  -Excisional debridement of wound not necessary in clinic today  -Patient to return to clinic weekly for assessment and debridement  -Patient to change dressing 1-2 times per week in between clinic visits  -Culture taken of wound due to expressed purulent fluid.  -Ultrasound ordered to rule out recurrent abscess formation    2. Wound infection  -Patient has finished her course of antibiotics Augmentin and doxycycline on 4/27/2020    3. Pain associated with wound  -2% viscous lidocaine applied topically to wound bed for approximately 5 minutes prior to debridement  -Patient tolerated procedure today with  no complaints of discomfort.        PATIENT EDUCATION  - Importance of adequate nutrition for wound healing  -Advised to go to ER for any increased redness, swelling, drainage, or odor, or if patient develops fever, chills, nausea or vomiting.     15 min spent face to face with patient, >50% of time spent counseling, coordinating care, reviewing records, discussing POC, educating patient regarding wound healing and progression.  This time was spent in excess to procedure time.       Please note that this note may have been created using voice recognition software. I have worked with technical experts from [employer] to optimize the interface.  I have made every reasonable attempt to correct obvious errors, but there may be errors of grammar and possibly content that I did not discover before finalizing the note.    N

## 2020-04-30 NOTE — PROGRESS NOTES
Patients sister in law contacted the clinic about the ultrasound ordered during today's appointment. The sister in law states that the patient had a mammogram performed yesterday by Dr. Heidy Restrepo, and that they also performed an ultrasound of the right axilla. The sister in law wanted to know if the ordered ultrasound could wait until the results from the first ultrasound return. The APRN from today's visit agreed, and the sister in law was instructed to contact [facility] clinic on Monday for an update.

## 2020-04-30 NOTE — PATIENT INSTRUCTIONS
-Keep your wound dressing clean, dry, and intact.    -Change your dressing if it becomes soiled, soaked, or falls off.    -Should you experience any significant changes in your wound(s), such as infection (redness, swelling, localized heat, increased pain, fever > 101 F, chills) or have any questions regarding your home care instructions, please contact the wound center at [phone]. If after hours, contact your primary care physician or go to the hospital emergency room.

## 2020-04-30 NOTE — PROGRESS NOTES
This RN attempted to contact the patient at [phone]. This RN contacted the patient because Dr. Restrepo is not a part of the [facility] [employer] system. This RN wanted to inform the patient that the results from her ultrasound would not be sent to [facility] because its not in the system. The patient did not have any identifiers on the answering machine, so a message was not left. This RN will attempt to contact the patient again on Monday.

## 2020-05-03 LAB
BACTERIA WND AEROBE CULT: ABNORMAL
BACTERIA WND AEROBE CULT: ABNORMAL
GRAM STN SPEC: ABNORMAL
SIGNIFICANT IND 70042: ABNORMAL
SITE SITE: ABNORMAL
SOURCE SOURCE: ABNORMAL

## 2020-05-04 NOTE — PROGRESS NOTES
Attempted to contact the patient at [phone] to remind her about the ultrasound ordered at the last visit. No identifiers on the answering machine.

## 2020-05-04 NOTE — PROGRESS NOTES
Received at phone call from the patients sisterShae from [phone]. The sister was calling for an update to determine if her sister still needed to get the ultrasound [facility] clinic ordered last week. This RN informed her that the provider reviewed her sisters ultrasound from the OBGYN. This RN instructed her to have the patient get the ultrasound around May 27th. This RN informed her that the initial ultrasound indicated that the abscess could have impeded the initial scan and that a second ultrasound would be necessary but further out. The sister agreed.

## 2020-05-06 ENCOUNTER — APPOINTMENT (OUTPATIENT)
Dept: RADIOLOGY | Facility: MEDICAL CENTER | Age: 45
End: 2020-05-06
Attending: NURSE PRACTITIONER
Payer: COMMERCIAL

## 2020-05-07 ENCOUNTER — OFFICE VISIT (OUTPATIENT)
Dept: WOUND CARE | Facility: MEDICAL CENTER | Age: 45
End: 2020-05-07
Attending: INTERNAL MEDICINE
Payer: COMMERCIAL

## 2020-05-07 ENCOUNTER — HOSPITAL ENCOUNTER (OUTPATIENT)
Dept: LAB | Facility: MEDICAL CENTER | Age: 45
End: 2020-05-07
Attending: FAMILY MEDICINE
Payer: COMMERCIAL

## 2020-05-07 VITALS
DIASTOLIC BLOOD PRESSURE: 96 MMHG | TEMPERATURE: 97.3 F | RESPIRATION RATE: 16 BRPM | SYSTOLIC BLOOD PRESSURE: 135 MMHG | OXYGEN SATURATION: 97 % | HEART RATE: 81 BPM

## 2020-05-07 DIAGNOSIS — L02.91 ABSCESS: ICD-10-CM

## 2020-05-07 DIAGNOSIS — R52 PAIN ASSOCIATED WITH WOUND: ICD-10-CM

## 2020-05-07 DIAGNOSIS — T14.8XXA WOUND INFECTION: ICD-10-CM

## 2020-05-07 DIAGNOSIS — T14.8XXA PAIN ASSOCIATED WITH WOUND: ICD-10-CM

## 2020-05-07 DIAGNOSIS — L08.9 WOUND INFECTION: ICD-10-CM

## 2020-05-07 DIAGNOSIS — I10 ESSENTIAL HYPERTENSION: ICD-10-CM

## 2020-05-07 LAB
ALBUMIN SERPL BCP-MCNC: 4.3 G/DL (ref 3.2–4.9)
ALBUMIN/GLOB SERPL: 1.5 G/DL
ALP SERPL-CCNC: 76 U/L (ref 30–99)
ALT SERPL-CCNC: 23 U/L (ref 2–50)
ANION GAP SERPL CALC-SCNC: 10 MMOL/L (ref 7–16)
AST SERPL-CCNC: 17 U/L (ref 12–45)
BASOPHILS # BLD AUTO: 0.4 % (ref 0–1.8)
BASOPHILS # BLD: 0.04 K/UL (ref 0–0.12)
BILIRUB SERPL-MCNC: 0.3 MG/DL (ref 0.1–1.5)
BUN SERPL-MCNC: 12 MG/DL (ref 8–22)
CALCIUM SERPL-MCNC: 8.8 MG/DL (ref 8.4–10.2)
CHLORIDE SERPL-SCNC: 103 MMOL/L (ref 96–112)
CHOLEST SERPL-MCNC: 213 MG/DL (ref 100–199)
CO2 SERPL-SCNC: 27 MMOL/L (ref 20–33)
CREAT SERPL-MCNC: 0.64 MG/DL (ref 0.5–1.4)
CREAT UR-MCNC: 147.64 MG/DL
EOSINOPHIL # BLD AUTO: 0.31 K/UL (ref 0–0.51)
EOSINOPHIL NFR BLD: 3.2 % (ref 0–6.9)
ERYTHROCYTE [DISTWIDTH] IN BLOOD BY AUTOMATED COUNT: 39.9 FL (ref 35.9–50)
FASTING STATUS PATIENT QL REPORTED: NORMAL
GLOBULIN SER CALC-MCNC: 2.9 G/DL (ref 1.9–3.5)
GLUCOSE SERPL-MCNC: 124 MG/DL (ref 65–99)
HCT VFR BLD AUTO: 44.4 % (ref 37–47)
HDLC SERPL-MCNC: 47 MG/DL
HGB BLD-MCNC: 14.8 G/DL (ref 12–16)
IMM GRANULOCYTES # BLD AUTO: 0.03 K/UL (ref 0–0.11)
IMM GRANULOCYTES NFR BLD AUTO: 0.3 % (ref 0–0.9)
LDLC SERPL CALC-MCNC: 137 MG/DL
LYMPHOCYTES # BLD AUTO: 2.09 K/UL (ref 1–4.8)
LYMPHOCYTES NFR BLD: 21.4 % (ref 22–41)
MCH RBC QN AUTO: 28.6 PG (ref 27–33)
MCHC RBC AUTO-ENTMCNC: 33.3 G/DL (ref 33.6–35)
MCV RBC AUTO: 85.7 FL (ref 81.4–97.8)
MICROALBUMIN UR-MCNC: 2.2 MG/DL
MICROALBUMIN/CREAT UR: 15 MG/G (ref 0–30)
MONOCYTES # BLD AUTO: 0.51 K/UL (ref 0–0.85)
MONOCYTES NFR BLD AUTO: 5.2 % (ref 0–13.4)
NEUTROPHILS # BLD AUTO: 6.8 K/UL (ref 2–7.15)
NEUTROPHILS NFR BLD: 69.5 % (ref 44–72)
NRBC # BLD AUTO: 0 K/UL
NRBC BLD-RTO: 0 /100 WBC
PLATELET # BLD AUTO: 282 K/UL (ref 164–446)
PMV BLD AUTO: 9.9 FL (ref 9–12.9)
POTASSIUM SERPL-SCNC: 3.8 MMOL/L (ref 3.6–5.5)
PROT SERPL-MCNC: 7.2 G/DL (ref 6–8.2)
RBC # BLD AUTO: 5.18 M/UL (ref 4.2–5.4)
SODIUM SERPL-SCNC: 140 MMOL/L (ref 135–145)
TRIGL SERPL-MCNC: 145 MG/DL (ref 0–149)
WBC # BLD AUTO: 9.8 K/UL (ref 4.8–10.8)

## 2020-05-07 PROCEDURE — 99213 OFFICE O/P EST LOW 20 MIN: CPT

## 2020-05-07 PROCEDURE — 99213 OFFICE O/P EST LOW 20 MIN: CPT | Performed by: NURSE PRACTITIONER

## 2020-05-07 PROCEDURE — 82043 UR ALBUMIN QUANTITATIVE: CPT

## 2020-05-07 PROCEDURE — 80053 COMPREHEN METABOLIC PANEL: CPT

## 2020-05-07 PROCEDURE — 36415 COLL VENOUS BLD VENIPUNCTURE: CPT

## 2020-05-07 PROCEDURE — 80061 LIPID PANEL: CPT

## 2020-05-07 PROCEDURE — 83036 HEMOGLOBIN GLYCOSYLATED A1C: CPT

## 2020-05-07 PROCEDURE — 82570 ASSAY OF URINE CREATININE: CPT

## 2020-05-07 PROCEDURE — 85025 COMPLETE CBC W/AUTO DIFF WBC: CPT

## 2020-05-07 ASSESSMENT — ENCOUNTER SYMPTOMS
CHILLS: 0
CONSTIPATION: 0
NAUSEA: 0
PALPITATIONS: 0
NERVOUS/ANXIOUS: 0
VOMITING: 0
DEPRESSION: 0
FEVER: 0
DIARRHEA: 0

## 2020-05-07 ASSESSMENT — PAIN SCALES - GENERAL: PAINLEVEL: 5=MODERATE PAIN

## 2020-05-07 NOTE — PROGRESS NOTES
Provider Encounter- Full Thickness wound     tablet used for today's encounter as patient speaks Tagalog    HISTORY OF PRESENT ILLNESS  Wound History:    START OF CARE IN CLINIC: 4/23/2020    REFERRING PROVIDER: Dr. Ahmadi     WOUND- Full Thickness Wound   LOCATION: Right armpit   HISTORY: Patient presented to [facility] on 4/16/2020 with pain to her right armpit.  She was found to have hidradenitis with underlying abscess.  The ER physician performed an I&D which was subsequently followed up by an additional I&D by Dr. Wilkerson on 4/18/2020.  Patient was initiated on IV antibiotics and switched over to oral Augmentin and doxycycline with continuation of this course for 7 more days post discharge.  Patient was discharged from the hospital with a referral to [facility] for wound care treatment and management.    Pertinent Medical History: Hydradenitis, hypertension, obesity    TOBACCO USE: Patient denies smoking or using smokeless tobacco    Patient's problem list, allergies, and current medications reviewed and updated in Epic    Interval History:  4/30/2020: Initial clinic visit visit with BRADEN Wade. Patient states that they are feeling well today.  Patient denies fever, chills, nausea, vomiting, lightheadedness, dizziness, shortness of breath and chest pain.  Patient with significant induration to right arm.  Patient had some purulent drainage expressed from wound.  Therefore I will order an ultrasound to rule out any recurrence of abscess.  Culture sent to rule out any bacterial infection.    5/7/2020 : Clinic visit with BRADEN Ledbetter.  Patient states she is feeling well, denies fevers, chills, nausea, vomiting, cough or shortness of breath.  She presents with quite a bit of swelling to her armpit today.  States she was seen by Dr. Aiken yesterday, and that he intends to repeat I&D on the 15th.  She was prescribed Keflex p.o. by him.  Because she is undergoing further  procedure, we will discharge her from [facility].  She understands that Dr. Wilkerson will need to refer her back to the wound clinic after procedure.        REVIEW OF SYSTEMS:   Review of Systems   Constitutional: Negative for chills and fever.   Cardiovascular: Negative for chest pain, palpitations and leg swelling.   Gastrointestinal: Negative for constipation, diarrhea, nausea and vomiting.   Skin: Negative for itching and rash.        Tender swelling in right armpit   Psychiatric/Behavioral: Negative for depression. The patient is not nervous/anxious.        PHYSICAL EXAMINATION:   /96   Pulse 81   Temp 36.3 °C (97.3 °F)   Resp 16   LMP 04/25/2020   SpO2 97%      Physical Exam   Constitutional: She is oriented to person, place, and time and well-developed, well-nourished, and in no distress.   HENT:   Head: Normocephalic and atraumatic.   Eyes: Pupils are equal, round, and reactive to light.   Cardiovascular: Intact distal pulses.   Pulmonary/Chest: Effort normal. No respiratory distress. She has no wheezes.   Musculoskeletal:         General: Edema present.      Comments: Hard induration to right axillary region   Neurological: She is alert and oriented to person, place, and time.   Skin: Skin is warm and dry. No rash noted. No erythema.   Small open wound to right axilla, purulent drainage noted with application of pressure to periwound  Large area of induration  Refer to wound flowsheet and photos   Psychiatric: Mood and affect normal.       WOUND ASSESSMENT   Wound 04/23/20 Axilla Lateral Right axilla (Active)   Wound Image     5/7/2020  2:15 PM   Site Assessment Pink;Red;Yellow 5/7/2020  2:15 PM   Periwound Assessment Dry;Intact;Induration 5/7/2020  2:15 PM   Margins Attached edges 5/7/2020  2:15 PM   Closure Secondary intention 4/30/2020  1:00 PM   Drainage Amount Moderate 5/7/2020  2:15 PM   Drainage Description Serosanguineous 5/7/2020  2:15 PM   Treatments Cleansed;Topical Lidocaine 5/7/2020  2:15 PM    Wound Cleansing Normal Saline Irrigation 5/7/2020  2:15 PM   Periwound Protectant Skin Protectant Wipes to Periwound;Barrier Paste 5/7/2020  2:15 PM   Dressing Cleansing/Solutions Not Applicable 5/7/2020  2:15 PM   Dressing Options Hydrofiber Silver Strip;Hydrofiber Silver;Silicone Adhesive Foam 5/7/2020  2:15 PM   Dressing Changed Changed 5/7/2020  2:15 PM   Dressing Status Clean;Dry;Intact 5/7/2020  2:15 PM   Dressing Change/Treatment Frequency Every 72 hrs, and As Needed 5/7/2020  2:15 PM   Non-staged Wound Description Full thickness 5/7/2020  2:15 PM   Wound Length (cm) 0.5 cm 5/7/2020  2:15 PM   Wound Width (cm) 0.2 cm 5/7/2020  2:15 PM   Wound Depth (cm) 0.5 cm 5/7/2020  2:15 PM   Wound Surface Area (cm^2) 0.1 cm^2 5/7/2020  2:15 PM   Wound Volume (cm^3) 0.05 cm^3 5/7/2020  2:15 PM   Post-Procedure Length (cm) 0.5 cm 5/7/2020  2:15 PM   Post-Procedure Width (cm) 0.3 cm 5/7/2020  2:15 PM   Post-Procedure Depth (cm) 1 cm 5/7/2020  2:15 PM   Post-Procedure Surface Area (cm^2) 0.15 cm^2 5/7/2020  2:15 PM   Post-Procedure Volume (cm^3) 0.15 cm^3 5/7/2020  2:15 PM   Wound Healing % 55 5/7/2020  2:15 PM   Tunneling (cm) 0 cm 5/7/2020  2:15 PM   Undermining (cm) 0 cm 5/7/2020  2:15 PM   Wound Odor None 5/7/2020  2:15 PM   Pulses N/A 4/23/2020  9:00 AM   Exposed Structures None 5/7/2020  2:15 PM        PROCEDURE:   -No debridement today  -Wound probed with cotton-tipped applicator, pressure applied manually to periwound expressing 5-10 mils of purulent drainage  -Wound care completed by wound RN, refer to flowsheet      Pertinent Labs and Diagnostics:    Labs:     A1c:   Lab Results   Component Value Date/Time    HBA1C 5.8 (H) 04/17/2020 03:06 AM          IMAGING: N/A    VASCULAR STUDIES: N/A    LAST  WOUND CULTURE:  DATE : Positive wound culture 4/30/2020 abscess  Culture Result Abnormal    Viridans Streptococcus   Rare growth                 ASSESSMENT AND PLAN:     1.  Abscess  Comments: Abscess due to  hydradenitis, status post I&D on 4/18/2020 5/7/2020: Patient presents today with increased swelling and purulent drainage.  Was seen by Dr. Wilkerson yesterday, repeat I&D scheduled for 5/15  -Wound probed and assessed.  No debridement today  -Discharge from [facility] she is undergoing repeat I&D next week  -She will require new referral back to [facility] if wound is left open, and Dr. Wilkerson feel she requires further wound care  -Family member to change dressing daily and as needed saturation    Wound care: Silver strip packing to manage exudate and bioburden, silicone adhesive foam cover dressing      2. Wound infection    5/7/2020: New wound culture taken on 4/30, positive for rare growth viridans strep.  She was prescribed Keflex by Dr. Wilkerson, has not yet filled prescription    -Patient advised to take antibiotics as prescribed  -Pt advised to go to ER for any increased redness, swelling, drainage or odor, or if he develops fever, chills, nausea or vomiting.    3. Pain associated with wound  -2% viscous lidocaine applied topically to wound bed for approximately 5 minutes prior to debridement  -Patient tolerated procedure today with no complaints of discomfort.        PATIENT EDUCATION  - Importance of adequate nutrition for wound healing  -Advised to go to ER for any increased redness, swelling, drainage, or odor, or if patient develops fever, chills, nausea or vomiting.     15 min spent face to face with patient, >50% of time spent counseling, coordinating care, reviewing records, discussing POC, educating patient regarding wound healing and progression.  This time was spent in excess to procedure time.       Please note that this note may have been created using voice recognition software. I have worked with technical experts from [employer] to optimize the interface.  I have made every reasonable attempt to correct obvious errors, but there may be errors of grammar and possibly content that I did not discover before  finalizing the note.    N

## 2020-05-07 NOTE — WOUND TEAM
Juan Pablo  - Mo #[id number], utilized during appointment.    Patient is discharged at this time as she is schedule to have surgery with Dr. Aiken on 5/15/20 to remove another abscess. Patient verbalized understanding that she will only come back to [facility] if surgeon refers her back.

## 2020-05-07 NOTE — PATIENT INSTRUCTIONS
Should you experience any significant changes in your wound(s) such as infection (redness, swelling, localized heat, increased pain, fever >101 F, chills) or have any questions regarding your home care instructions, please contact the wound center [phone]. If after hours, contact your primary care physician or go the hospital emergency room.  Keep dressing clean and dry and cover while bathing. Only change dressing if over saturated, soiled or its falling off.

## 2020-05-08 ENCOUNTER — TELEPHONE (OUTPATIENT)
Dept: MEDICAL GROUP | Facility: MEDICAL CENTER | Age: 45
End: 2020-05-08

## 2020-05-08 LAB
EST. AVERAGE GLUCOSE BLD GHB EST-MCNC: 111 MG/DL
HBA1C MFR BLD: 5.5 % (ref 0–5.6)

## 2020-05-08 NOTE — TELEPHONE ENCOUNTER
----- Message from Marily Brandt M.D. sent at 5/8/2020 12:47 PM PDT -----  Please notify patient that her labs are stable. I will discuss the results in detail with her at her appointment on 5/20/20.

## 2020-05-08 NOTE — TELEPHONE ENCOUNTER
Phone Number Called: [phone] (home)     Call outcome: Did not leave a detailed message. Requested patient to call back.    Message: LVM for pt. To call back for lab results.

## 2020-05-11 DIAGNOSIS — Z01.812 PRE-OPERATIVE LABORATORY EXAMINATION: ICD-10-CM

## 2020-05-11 LAB — COVID ORDER STATUS COVID19: NORMAL

## 2020-05-11 PROCEDURE — C9803 HOPD COVID-19 SPEC COLLECT: HCPCS

## 2020-05-12 RX ORDER — CEPHALEXIN 500 MG/1
500 CAPSULE ORAL 4 TIMES DAILY
COMMUNITY
End: 2020-07-08

## 2020-05-12 NOTE — TELEPHONE ENCOUNTER
Phone Number Called: [phone] (home)     Call outcome: Did not leave a detailed message. Requested patient to call back.    Message: Unable to reach pt, letter mailed to address on file.

## 2020-05-12 NOTE — TELEPHONE ENCOUNTER
Phone Number called: [phone] (home)   Message: Left pt a mess to call back regarding test results. I also sent pt a mess via my chart.

## 2020-05-13 LAB
SARS-COV-2 RNA RESP QL NAA+PROBE: NOT DETECTED
SPECIMEN SOURCE: NORMAL

## 2020-05-14 ENCOUNTER — APPOINTMENT (OUTPATIENT)
Dept: WOUND CARE | Facility: MEDICAL CENTER | Age: 45
End: 2020-05-14
Attending: INTERNAL MEDICINE
Payer: COMMERCIAL

## 2020-05-15 ENCOUNTER — HOSPITAL ENCOUNTER (OUTPATIENT)
Facility: MEDICAL CENTER | Age: 45
End: 2020-05-15
Attending: SURGERY | Admitting: SURGERY
Payer: COMMERCIAL

## 2020-05-15 ENCOUNTER — ANESTHESIA (OUTPATIENT)
Dept: SURGERY | Facility: MEDICAL CENTER | Age: 45
End: 2020-05-15
Payer: COMMERCIAL

## 2020-05-15 ENCOUNTER — ANESTHESIA EVENT (OUTPATIENT)
Dept: SURGERY | Facility: MEDICAL CENTER | Age: 45
End: 2020-05-15
Payer: COMMERCIAL

## 2020-05-15 VITALS
BODY MASS INDEX: 30.51 KG/M2 | HEART RATE: 77 BPM | DIASTOLIC BLOOD PRESSURE: 65 MMHG | SYSTOLIC BLOOD PRESSURE: 109 MMHG | OXYGEN SATURATION: 95 % | WEIGHT: 172.18 LBS | TEMPERATURE: 97.5 F | RESPIRATION RATE: 16 BRPM | HEIGHT: 63 IN

## 2020-05-15 DIAGNOSIS — G89.18 POST-OP PAIN: ICD-10-CM

## 2020-05-15 LAB
GRAM STN SPEC: NORMAL
HCG UR QL: NEGATIVE
PATHOLOGY CONSULT NOTE: NORMAL
SIGNIFICANT IND 70042: NORMAL
SITE SITE: NORMAL
SOURCE SOURCE: NORMAL

## 2020-05-15 PROCEDURE — 700105 HCHG RX REV CODE 258: Performed by: SURGERY

## 2020-05-15 PROCEDURE — 87070 CULTURE OTHR SPECIMN AEROBIC: CPT

## 2020-05-15 PROCEDURE — A9270 NON-COVERED ITEM OR SERVICE: HCPCS | Performed by: ANESTHESIOLOGY

## 2020-05-15 PROCEDURE — 81025 URINE PREGNANCY TEST: CPT

## 2020-05-15 PROCEDURE — 87075 CULTR BACTERIA EXCEPT BLOOD: CPT

## 2020-05-15 PROCEDURE — 160002 HCHG RECOVERY MINUTES (STAT): Performed by: SURGERY

## 2020-05-15 PROCEDURE — 501445 HCHG STAPLER, SKIN DISP: Performed by: SURGERY

## 2020-05-15 PROCEDURE — 160048 HCHG OR STATISTICAL LEVEL 1-5: Performed by: SURGERY

## 2020-05-15 PROCEDURE — 700111 HCHG RX REV CODE 636 W/ 250 OVERRIDE (IP): Performed by: ANESTHESIOLOGY

## 2020-05-15 PROCEDURE — 87205 SMEAR GRAM STAIN: CPT

## 2020-05-15 PROCEDURE — 160039 HCHG SURGERY MINUTES - EA ADDL 1 MIN LEVEL 3: Performed by: SURGERY

## 2020-05-15 PROCEDURE — 160046 HCHG PACU - 1ST 60 MINS PHASE II: Performed by: SURGERY

## 2020-05-15 PROCEDURE — 160035 HCHG PACU - 1ST 60 MINS PHASE I: Performed by: SURGERY

## 2020-05-15 PROCEDURE — 160009 HCHG ANES TIME/MIN: Performed by: SURGERY

## 2020-05-15 PROCEDURE — 700102 HCHG RX REV CODE 250 W/ 637 OVERRIDE(OP): Performed by: SURGERY

## 2020-05-15 PROCEDURE — 160028 HCHG SURGERY MINUTES - 1ST 30 MINS LEVEL 3: Performed by: SURGERY

## 2020-05-15 PROCEDURE — 700101 HCHG RX REV CODE 250: Performed by: SURGERY

## 2020-05-15 PROCEDURE — 88304 TISSUE EXAM BY PATHOLOGIST: CPT

## 2020-05-15 PROCEDURE — 160047 HCHG PACU  - EA ADDL 30 MINS PHASE II: Performed by: SURGERY

## 2020-05-15 PROCEDURE — A9270 NON-COVERED ITEM OR SERVICE: HCPCS | Performed by: SURGERY

## 2020-05-15 PROCEDURE — 700101 HCHG RX REV CODE 250: Performed by: ANESTHESIOLOGY

## 2020-05-15 PROCEDURE — 88307 TISSUE EXAM BY PATHOLOGIST: CPT

## 2020-05-15 PROCEDURE — 160025 RECOVERY II MINUTES (STATS): Performed by: SURGERY

## 2020-05-15 PROCEDURE — 501838 HCHG SUTURE GENERAL: Performed by: SURGERY

## 2020-05-15 PROCEDURE — 700102 HCHG RX REV CODE 250 W/ 637 OVERRIDE(OP): Performed by: ANESTHESIOLOGY

## 2020-05-15 PROCEDURE — 700105 HCHG RX REV CODE 258: Performed by: ANESTHESIOLOGY

## 2020-05-15 PROCEDURE — 160036 HCHG PACU - EA ADDL 30 MINS PHASE I: Performed by: SURGERY

## 2020-05-15 RX ORDER — ONDANSETRON 2 MG/ML
INJECTION INTRAMUSCULAR; INTRAVENOUS PRN
Status: DISCONTINUED | OUTPATIENT
Start: 2020-05-15 | End: 2020-05-15 | Stop reason: SURG

## 2020-05-15 RX ORDER — SODIUM CHLORIDE, SODIUM LACTATE, POTASSIUM CHLORIDE, CALCIUM CHLORIDE 600; 310; 30; 20 MG/100ML; MG/100ML; MG/100ML; MG/100ML
INJECTION, SOLUTION INTRAVENOUS CONTINUOUS
Status: DISCONTINUED | OUTPATIENT
Start: 2020-05-15 | End: 2020-05-15 | Stop reason: HOSPADM

## 2020-05-15 RX ORDER — HYDRALAZINE HYDROCHLORIDE 20 MG/ML
5 INJECTION INTRAMUSCULAR; INTRAVENOUS
Status: DISCONTINUED | OUTPATIENT
Start: 2020-05-15 | End: 2020-05-15 | Stop reason: HOSPADM

## 2020-05-15 RX ORDER — DIPHENHYDRAMINE HYDROCHLORIDE 50 MG/ML
12.5 INJECTION INTRAMUSCULAR; INTRAVENOUS
Status: DISCONTINUED | OUTPATIENT
Start: 2020-05-15 | End: 2020-05-15 | Stop reason: HOSPADM

## 2020-05-15 RX ORDER — MEPERIDINE HYDROCHLORIDE 25 MG/ML
6.25 INJECTION INTRAMUSCULAR; INTRAVENOUS; SUBCUTANEOUS
Status: DISCONTINUED | OUTPATIENT
Start: 2020-05-15 | End: 2020-05-15 | Stop reason: HOSPADM

## 2020-05-15 RX ORDER — OXYCODONE HCL 5 MG/5 ML
10 SOLUTION, ORAL ORAL
Status: COMPLETED | OUTPATIENT
Start: 2020-05-15 | End: 2020-05-15

## 2020-05-15 RX ORDER — OXYCODONE HCL 5 MG/5 ML
5 SOLUTION, ORAL ORAL
Status: COMPLETED | OUTPATIENT
Start: 2020-05-15 | End: 2020-05-15

## 2020-05-15 RX ORDER — LIDOCAINE HYDROCHLORIDE 20 MG/ML
INJECTION, SOLUTION EPIDURAL; INFILTRATION; INTRACAUDAL; PERINEURAL PRN
Status: DISCONTINUED | OUTPATIENT
Start: 2020-05-15 | End: 2020-05-15 | Stop reason: SURG

## 2020-05-15 RX ORDER — HYDROMORPHONE HYDROCHLORIDE 1 MG/ML
0.2 INJECTION, SOLUTION INTRAMUSCULAR; INTRAVENOUS; SUBCUTANEOUS
Status: DISCONTINUED | OUTPATIENT
Start: 2020-05-15 | End: 2020-05-15 | Stop reason: HOSPADM

## 2020-05-15 RX ORDER — DEXAMETHASONE SODIUM PHOSPHATE 4 MG/ML
INJECTION, SOLUTION INTRA-ARTICULAR; INTRALESIONAL; INTRAMUSCULAR; INTRAVENOUS; SOFT TISSUE PRN
Status: DISCONTINUED | OUTPATIENT
Start: 2020-05-15 | End: 2020-05-15 | Stop reason: SURG

## 2020-05-15 RX ORDER — HALOPERIDOL 5 MG/ML
1 INJECTION INTRAMUSCULAR
Status: DISCONTINUED | OUTPATIENT
Start: 2020-05-15 | End: 2020-05-15 | Stop reason: HOSPADM

## 2020-05-15 RX ORDER — ONDANSETRON 2 MG/ML
4 INJECTION INTRAMUSCULAR; INTRAVENOUS
Status: DISCONTINUED | OUTPATIENT
Start: 2020-05-15 | End: 2020-05-15 | Stop reason: HOSPADM

## 2020-05-15 RX ORDER — HYDROMORPHONE HYDROCHLORIDE 1 MG/ML
0.1 INJECTION, SOLUTION INTRAMUSCULAR; INTRAVENOUS; SUBCUTANEOUS
Status: DISCONTINUED | OUTPATIENT
Start: 2020-05-15 | End: 2020-05-15 | Stop reason: HOSPADM

## 2020-05-15 RX ORDER — LABETALOL HYDROCHLORIDE 5 MG/ML
5 INJECTION, SOLUTION INTRAVENOUS
Status: DISCONTINUED | OUTPATIENT
Start: 2020-05-15 | End: 2020-05-15 | Stop reason: HOSPADM

## 2020-05-15 RX ORDER — CEFAZOLIN SODIUM 1 G/3ML
INJECTION, POWDER, FOR SOLUTION INTRAMUSCULAR; INTRAVENOUS PRN
Status: DISCONTINUED | OUTPATIENT
Start: 2020-05-15 | End: 2020-05-15 | Stop reason: SURG

## 2020-05-15 RX ORDER — HYDROMORPHONE HYDROCHLORIDE 1 MG/ML
0.4 INJECTION, SOLUTION INTRAMUSCULAR; INTRAVENOUS; SUBCUTANEOUS
Status: DISCONTINUED | OUTPATIENT
Start: 2020-05-15 | End: 2020-05-15 | Stop reason: HOSPADM

## 2020-05-15 RX ORDER — HYDROCODONE BITARTRATE AND ACETAMINOPHEN 5; 325 MG/1; MG/1
1-2 TABLET ORAL EVERY 4 HOURS PRN
Qty: 6 TAB | Refills: 0 | Status: SHIPPED | OUTPATIENT
Start: 2020-05-15 | End: 2020-05-17

## 2020-05-15 RX ORDER — CEPHALEXIN 500 MG/1
500 CAPSULE ORAL 4 TIMES DAILY
Qty: 28 CAP | Refills: 0 | Status: SHIPPED | OUTPATIENT
Start: 2020-05-15 | End: 2020-05-20

## 2020-05-15 RX ADMIN — SODIUM CHLORIDE, POTASSIUM CHLORIDE, SODIUM LACTATE AND CALCIUM CHLORIDE: 600; 310; 30; 20 INJECTION, SOLUTION INTRAVENOUS at 12:33

## 2020-05-15 RX ADMIN — LIDOCAINE HYDROCHLORIDE 0.5 ML: 10 INJECTION, SOLUTION EPIDURAL; INFILTRATION; INTRACAUDAL at 12:24

## 2020-05-15 RX ADMIN — FENTANYL CITRATE 25 MCG: 50 INJECTION INTRAMUSCULAR; INTRAVENOUS at 13:40

## 2020-05-15 RX ADMIN — SODIUM CHLORIDE, POTASSIUM CHLORIDE, SODIUM LACTATE AND CALCIUM CHLORIDE: 600; 310; 30; 20 INJECTION, SOLUTION INTRAVENOUS at 15:04

## 2020-05-15 RX ADMIN — PROPOFOL 200 MG: 10 INJECTION, EMULSION INTRAVENOUS at 13:36

## 2020-05-15 RX ADMIN — FENTANYL CITRATE 50 MCG: 50 INJECTION INTRAMUSCULAR; INTRAVENOUS at 15:03

## 2020-05-15 RX ADMIN — DEXAMETHASONE SODIUM PHOSPHATE 4 MG: 4 INJECTION, SOLUTION INTRA-ARTICULAR; INTRALESIONAL; INTRAMUSCULAR; INTRAVENOUS; SOFT TISSUE at 13:39

## 2020-05-15 RX ADMIN — CEFAZOLIN 2 G: 330 INJECTION, POWDER, FOR SOLUTION INTRAMUSCULAR; INTRAVENOUS at 13:39

## 2020-05-15 RX ADMIN — ONDANSETRON 4 MG: 2 INJECTION INTRAMUSCULAR; INTRAVENOUS at 13:39

## 2020-05-15 RX ADMIN — FENTANYL CITRATE 25 MCG: 50 INJECTION INTRAMUSCULAR; INTRAVENOUS at 13:36

## 2020-05-15 RX ADMIN — OXYCODONE HYDROCHLORIDE 5 MG: 5 SOLUTION ORAL at 15:00

## 2020-05-15 RX ADMIN — FENTANYL CITRATE 25 MCG: 50 INJECTION INTRAMUSCULAR; INTRAVENOUS at 13:55

## 2020-05-15 RX ADMIN — FENTANYL CITRATE 25 MCG: 50 INJECTION INTRAMUSCULAR; INTRAVENOUS at 13:59

## 2020-05-15 RX ADMIN — LIDOCAINE HYDROCHLORIDE 50 MG: 20 INJECTION, SOLUTION EPIDURAL; INFILTRATION; INTRACAUDAL at 13:36

## 2020-05-15 RX ADMIN — POVIDONE-IODINE 15 ML: 10 SOLUTION TOPICAL at 12:24

## 2020-05-15 ASSESSMENT — FIBROSIS 4 INDEX: FIB4 SCORE: 0.53

## 2020-05-15 NOTE — ANESTHESIA PREPROCEDURE EVALUATION
Relevant Problems   ANESTHESIA (within normal limits)      PULMONARY (within normal limits)      NEURO (within normal limits)      CARDIAC   (+) Essential hypertension      GI (within normal limits)       (within normal limits)      ENDO (within normal limits)      Other   (+) Class 1 obesity due to excess calories with serious comorbidity in adult       Physical Exam    Airway   Mallampati: II  TM distance: >3 FB  Neck ROM: full       Cardiovascular - normal exam  Rhythm: regular  Rate: normal  (-) murmur     Dental - normal exam           Pulmonary - normal exam  Breath sounds clear to auscultation     Abdominal    Neurological - normal exam                 Anesthesia Plan    ASA 2       Plan - general       Airway plan will be LMA        Induction: intravenous    Postoperative Plan: Postoperative administration of opioids is intended.    Pertinent diagnostic labs and testing reviewed    Informed Consent:    Anesthetic plan and risks discussed with patient.    Use of blood products discussed with: patient whom consented to blood products.

## 2020-05-15 NOTE — ANESTHESIA TIME REPORT
Anesthesia Start and Stop Event Times     Date Time Event    5/15/2020 1312 Ready for Procedure     1332 Anesthesia Start     1424 Anesthesia Stop        Responsible Staff  05/15/20    Name Role Begin End    Farrukh Matute M.D. Anesth 1332 1424        Preop Diagnosis (Free Text):  Pre-op Diagnosis     AXILLARY MASS        Preop Diagnosis (Codes):    Post op Diagnosis  Axillary mass, right      Premium Reason  Non-Premium    Comments:

## 2020-05-15 NOTE — ANESTHESIA PROCEDURE NOTES
Airway    Date/Time: 5/15/2020 1:36 PM  Performed by: Farrukh Matute M.D.  Authorized by: Farrukh Matute M.D.     Location:  OR  Urgency:  Elective  Indications for Airway Management:  Anesthesia      Spontaneous Ventilation: absent    Sedation Level:  Deep  Preoxygenated: Yes    Mask Difficulty Assessment:  0 - not attempted  Final Airway Type:  Supraglottic airway  Final Supraglottic Airway:  Standard LMA    SGA Size:  4  Number of Attempts at Approach:  1

## 2020-05-15 NOTE — DISCHARGE INSTRUCTIONS
ACTIVITY: Rest and take it easy for the first 24 hours.  A responsible adult is recommended to remain with you during that time.  It is normal to feel sleepy.  We encourage you to not do anything that requires balance, judgment or coordination.    MILD FLU-LIKE SYMPTOMS ARE NORMAL. YOU MAY EXPERIENCE GENERALIZED MUSCLE ACHES, THROAT IRRITATION, HEADACHE AND/OR SOME NAUSEA.    FOR 24 HOURS DO NOT:  Drive, operate machinery or run household appliances.  Drink beer or alcoholic beverages.   Make important decisions or sign legal documents.    SPECIAL INSTRUCTIONS: Please instruction on drain care.  Followup in the office on Tuesday.     1. DIET: Upon discharge from the hospital you may resume your normal preoperative diet. Depending on how you are feeling and whether you have nausea or not, you may wish to stay with a bland diet for the first few days. However, you can advance this as quickly as you feel ready.     2. ACTIVITIES: After discharge from the hospital, you may resume full routine activities. However, there should be no heavy lifting (greater than 15 pounds) and no strenuous activities until after your follow-up visit. Otherwise, routine activities of daily living are acceptable.     3. DRIVING: You may drive whenever you are off pain medications and are able to perform the activities needed to drive, i.e. turning, bending, twisting, etc.     4. BATHING: You may get the wound wet at any time after leaving the hospital. You may shower, but do not submerge in a bath for at least a week. Dressings may come off after 48 hours. Leave steri strips on until they fall off.  It may be necessary to trim the edges.     5. BOWEL FUNCTION: Constipation is common after an operation, especially with pain medications. The combination of pain medication and decreased activity level can cause constipation in otherwise normal patients. If you feel this is occurring, take a laxative (Milk of Magnesia, Ex-Lax, Senokot, etc.)  until the problem has resolved.     6. PAIN MEDICATION: You will be given a prescription for pain medication at discharge. Please take these as directed. It is important to remember not to take medications on an empty stomach as this may cause nausea.     7.CALL IF YOU HAVE: (1) Fevers to more than 1010 F, (2) Unusual chest or leg pain, (3) Drainage or fluid from incision that may be foul smelling, increased tenderness or soreness at the wound or the wound edges are no longer together, redness or swelling at the incision site. Please do not hesitate to call with any other questions.     8. APPOINTMENT: Contact our office at [phone] for a follow-up appointment on Tuesday, May 19.   If you have any additional questions, please do not hesitate to call the office and speak to either myself or the physician on call.     Office address:    [facility] [id number]   Rei Wilkerson MD   [facility]   [phone]     Discharge Instructions: Caring for Your Will-Don Drainage Tube   Your doctor discharges you with a Will-Don drainage tube. Doctors commonly leave this drain within the abdomen and other cavities after surgery. It helps drain and collect blood and body fluid after surgery. This can prevent swelling and reduces the risk for infection. . The tube is held in place by a few stitches. It's covered with a bandage. Your doctor will remove the drain when he or she determines you no longer need it.   Home care  · Don’t sleep on the same side as the tube.   · Secure the tube and bag inside your clothing with a safety pin. This helps keep the tube from being pulled out.   · Empty your drain at least twice a day. Empty it more often if the drain is full. Wash  and dry your hands before emptying the drain.   ? Lift the opening on the drain.   ? Drain the fluid into a measuring cup.   ? Record the amount of fluid each time you empty the drain. Include the date and time it was emptied. Share this  information with your healthcare provider on your next visit.   ? Squeeze the bulb with your hands until you hear air coming out of the bulb if your healthcare provider has instructed you to do so (sometimes the bulb is used as a reservoir without suction). Check with your healthcare provider about specific drain instructions.   ? Close the opening.  · Change the dressing around the tube every day.   ? Wash your hands.  ? Remove the old bandage.   ? Wash your hands again.   ? Wet a cotton swab and clean the skin around the incision and tube site. Use normal saline solution (salt and water). Or, you can use warm, soapy water.   ? Put a new bandage on the incision and tube site. Make the bandage large enough to cover the whole incision area.   ? Tape the bandage in place.   · Keep the bandage and tube site dry when you shower. Ask your healthcare provider about the best way to do this.   · “Stripping” the tube helps keep blood clots from blocking the tube. Ask your nurse how often you should strip the tube. Stripping may not be needed, depending on where and why your doctor placed the tube. It may even be dangerous in some cases.    ? Hold the tubing where it leaves the skin, with one hand. This keeps it from pulling on the skin.   ? Pinch the tubing with the thumb and first finger of your other hand.   ? Slowly and firmly pull your thumb and first finger down the tubing. You may find it helpful to hold an alcohol swab between your fingers and the tube to lubricate the tubing.   ? If the pulling hurts or feels like it’s coming out of the skin, stop. Begin again more gently.     Follow-up care  Make a follow-up appointment as directed by our staff.   When to call your healthcare provider  Call your healthcare provider right away if you have any of the following:   · New or increased pain around the tube   · Redness, swelling, or warmth around the incision or tube   · Drainage that is foul-smelling   · Vomiting  · Fever  of 100.4°F ( 38°C) or higher, or as directed by your provider   · Fluid leaking around the tube   · Incision seems not to be healing   · Stitches become loose  · Tube falls out or breaks  · Drainage that changes from light pink to dark red   · Blood clots in the drainage bulb   · A sudden increase or decrease in the amount of drainage (over 30 mL)     DIET: To avoid nausea, slowly advance diet as tolerated, avoiding spicy or greasy foods for the first day.  Add more substantial food to your diet according to your physician's instructions.  Babies can be fed formula or breast milk as soon as they are hungry.  INCREASE FLUIDS AND FIBER TO AVOID CONSTIPATION.    FOLLOW-UP APPOINTMENT:  A follow-up appointment should be arranged with your doctor; call to schedule.    You should CALL YOUR PHYSICIAN if you develop:  Fever greater than 101 degrees F.  Pain not relieved by medication, or persistent nausea or vomiting.  Excessive bleeding (blood soaking through dressing) or unexpected drainage from the wound.  Extreme redness or swelling around the incision site, drainage of pus or foul smelling drainage.  Inability to urinate or empty your bladder within 8 hours.  Problems with breathing or chest pain.    You should call 911 if you develop problems with breathing or chest pain.  If you are unable to contact your doctor or surgical center, you should go to the nearest emergency room or urgent care center.  Physician's telephone #: [phone]    If any questions arise, call your doctor.  If your doctor is not available, please feel free to call the Surgical Center at [phone].  The Center is open Monday through Friday from 7AM to 7PM.  You can also call the "[employer] HOTLINE open 24 hours/day, 7 days/week and speak to a nurse at [phone], or toll free at [phone].    A registered nurse may call you a few days after your surgery to see how you are doing after your procedure.    MEDICATIONS: Resume taking daily  medication.  Take prescribed pain medication with food.  If no medication is prescribed, you may take non-aspirin pain medication if needed.  PAIN MEDICATION CAN BE VERY CONSTIPATING.  Take a stool softener or laxative such as senokot, pericolace, or milk of magnesia if needed.    Prescription given for Keflex and Norco.  Last pain medication given at 3:00 PM.    If your physician has prescribed pain medication that includes Acetaminophen (Tylenol), do not take additional Acetaminophen (Tylenol) while taking the prescribed medication.    Depression / Suicide Risk    As you are discharged from this [facility] facility, it is important to learn how to keep safe from harming yourself.    Recognize the warning signs:  · Abrupt changes in personality, positive or negative- including increase in energy   · Giving away possessions  · Change in eating patterns- significant weight changes-  positive or negative  · Change in sleeping patterns- unable to sleep or sleeping all the time   · Unwillingness or inability to communicate  · Depression  · Unusual sadness, discouragement and loneliness  · Talk of wanting to die  · Neglect of personal appearance   · Rebelliousness- reckless behavior  · Withdrawal from people/activities they love  · Confusion- inability to concentrate     If you or a loved one observes any of these behaviors or has concerns about self-harm, here's what you can do:  · Talk about it- your feelings and reasons for harming yourself  · Remove any means that you might use to hurt yourself (examples: pills, rope, extension cords, firearm)  · Get professional help from the community (Mental Health, Substance Abuse, psychological counseling)  · Do not be alone:Call your Safe Contact- someone whom you trust who will be there for you.  · Call your local CRISIS HOTLINE 345-0906 or 678-357-3047  · Call your local Children's Mobile Crisis Response Team [place] (004) 188-2896 or www.CNZZ  · Call the  toll free National Suicide Prevention Hotlines   · National Suicide Prevention Lifeline 127-455-VXZK (9270)  · National Hope Line Network 800-SUICIDE (276-1517)

## 2020-05-15 NOTE — OP REPORT
05/15/20    OPERATIVE NOTE    PRE-OPERATIVE DIAGNOSIS -    1.  Right axillary mass 5cm X 5cm    POST-OPERATIVE DIAGNOSIS -same    PROCEDURE PERFORMED - Excision right axillary mass 5cm X 5cm     SURGEON - Rei Wilkerson M.D.    ASSISTANT - LEON Gonzalez    TYPE OF ANESTHESIA - General     ANESTHESIOLOGIST  - Dr. Matute       SPECIMENS -right axillary mass    INDICATIONS - 44 y.o. [place] female who presented several weeks ago with a right axillary abscess.  The abscess was I&D at that time and she was placed on antibiotic therapy.  Patient has a persistent mass in the right axillary region with some persistent drainage.  Patient is being taken to the operating room today for excision.      PROCEDURE IN DETAIL -     Patient was taken to the operating suite placed in the supine position.  After adequate anesthesia the patient's right axilla was prepped and draped in sterile fashion.  An ellipse incision was made around the previous I&D site.  The incision was carried down through the subcutaneous tissue and through the axillary fat.  The mass was identified and was circumferentially dissected and isolated using electrocautery and blunt dissection.  The mass was excised in its entirety which measured 5 cm x 5 cm.  The specimen was sent for culture as well as pathology.  The area was irrigated with copious amounts normal saline.  A 10 flat fluted Will-Don drain was left within the axilla brought out through a separate stab incision and secured to the skin using a 3-0 nylon.  The subcutaneous tissue was reapproximated using 3-0 Vicryl interrupted sutures.  A 4-0 strata fix was then used to reapproximate the skin incision and Dermabond was placed over the incision as a barrier.  Patient was taken to the recovery room in stable condition.      Rei Wilkerson M.D.

## 2020-05-16 NOTE — OR NURSING
1552: Patient arrived from PACU to Phase II with RN. Patient is alert and awake. Patient able to ambulate from gurney to chair safely. Updated on POC. Patient tolerating PO intake.    1700: Patient able to void.    1715: Criteria met to discharge patient.    1725: Discharge paperwork reviewed with patient using interpretation services. Discussed activity, site care, drain care, worsening symptoms, and follow-up. Verbalized understanding. No further questions. PIV removed, tip intact.    1750: Patient escorted out in wheelchair with CNA. Discharge instructions and personal belongings in possession of the patient. Prescriptions in possession of patient. Copy of discharge instructions signed and placed in the chart. Patient discharged to home with responsible adult.

## 2020-05-18 LAB
BACTERIA WND AEROBE CULT: NORMAL
GRAM STN SPEC: NORMAL
SIGNIFICANT IND 70042: NORMAL
SITE SITE: NORMAL
SOURCE SOURCE: NORMAL

## 2020-05-18 ASSESSMENT — PAIN SCALES - GENERAL: PAIN_LEVEL: 4

## 2020-05-18 NOTE — ANESTHESIA POSTPROCEDURE EVALUATION
Patient: Kacey Mayaog    Procedure Summary     Date:  05/15/20 Room / Location:  [facility] 12 / SURGERY [facility]    Anesthesia Start:  1332 Anesthesia Stop:  1424    Procedure:  EXCISION, MASS - AXILLA (Right Axilla) Diagnosis:  (AXILLARY MASS)    Surgeon:  Rei Wilkerson M.D. Responsible Provider:  Farrukh Matute M.D.    Anesthesia Type:  general ASA Status:  2          Final Anesthesia Type: general  Last vitals  BP 88/55       Temp 36.1       Pulse 84      Resp 17       SpO2 92         Anesthesia Post Evaluation    Patient location during evaluation: PACU  Patient participation: complete - patient participated  Level of consciousness: awake and alert  Pain score: 4    Airway patency: patent  Anesthetic complications: no  Cardiovascular status: hemodynamically stable  Respiratory status: acceptable  Hydration status: euvolemic    PONV: none           Nurse Pain Score: 4 (NPRS)

## 2020-05-19 LAB
BACTERIA SPEC ANAEROBE CULT: ABNORMAL
BACTERIA SPEC ANAEROBE CULT: ABNORMAL
SIGNIFICANT IND 70042: ABNORMAL
SITE SITE: ABNORMAL
SOURCE SOURCE: ABNORMAL

## 2020-05-20 ENCOUNTER — OFFICE VISIT (OUTPATIENT)
Dept: MEDICAL GROUP | Facility: MEDICAL CENTER | Age: 45
End: 2020-05-20
Payer: COMMERCIAL

## 2020-05-20 VITALS
TEMPERATURE: 98.1 F | WEIGHT: 169.8 LBS | BODY MASS INDEX: 30.09 KG/M2 | DIASTOLIC BLOOD PRESSURE: 88 MMHG | SYSTOLIC BLOOD PRESSURE: 112 MMHG | HEART RATE: 86 BPM | OXYGEN SATURATION: 93 % | HEIGHT: 63 IN

## 2020-05-20 DIAGNOSIS — L73.2 HIDRADENITIS: ICD-10-CM

## 2020-05-20 DIAGNOSIS — I10 ESSENTIAL HYPERTENSION: ICD-10-CM

## 2020-05-20 DIAGNOSIS — E66.09 CLASS 1 OBESITY DUE TO EXCESS CALORIES WITH SERIOUS COMORBIDITY AND BODY MASS INDEX (BMI) OF 30.0 TO 30.9 IN ADULT: ICD-10-CM

## 2020-05-20 PROCEDURE — 99214 OFFICE O/P EST MOD 30 MIN: CPT | Performed by: FAMILY MEDICINE

## 2020-05-20 RX ORDER — LISINOPRIL 10 MG/1
10 TABLET ORAL DAILY
Qty: 90 TAB | Refills: 2 | Status: SHIPPED | OUTPATIENT
Start: 2020-05-20 | End: 2020-06-17

## 2020-05-20 ASSESSMENT — FIBROSIS 4 INDEX: FIB4 SCORE: 0.53

## 2020-05-21 ENCOUNTER — APPOINTMENT (OUTPATIENT)
Dept: WOUND CARE | Facility: MEDICAL CENTER | Age: 45
End: 2020-05-21
Attending: INTERNAL MEDICINE
Payer: COMMERCIAL

## 2020-05-21 NOTE — ASSESSMENT & PLAN NOTE
This is a chronic problem.  The patient has been on lisinopril 5 mg daily for about a month.  She reports her blood pressures have been fairly well-controlled however continues to have elevated diastolic readings 90s-100s.  She reports she is tolerating lisinopril well with no side effects. The patient denies chest pain, shortness of breath, headaches, vision changes, lightheadedness, dizziness.

## 2020-05-21 NOTE — ASSESSMENT & PLAN NOTE
This is a chronic problem.  The patient is working on improving her diet, increasing her exercise and losing weight.  She has lost 6 pounds in the last 3 weeks.  She would like to continue her weight loss independently and declines referral to the health improvement program at this time.

## 2020-05-21 NOTE — ASSESSMENT & PLAN NOTE
This is a chronic problem.  The patient has had recurrent left axilla abscesses. She was admitted to [facility] about a month ago and underwent I&D by Dr. Wilkerson.  She had recurrent abscess development and underwent a second I&D by Dr. Wilkerson on 5/15/20. She saw Dr. Wilkerson yesterday for follow up. She is on a 7-day course of Keflex with which she reports compliance.  She denies any fevers, chills, or malaise.  She reports her left axilla is healing well without redness, swelling, or purulent drainage.

## 2020-05-21 NOTE — PROGRESS NOTES
Subjective:     CC: follow up HTN    A qualified  was used to interpret  during this encounter.      HPI:   Kacey presents today with     Essential hypertension  This is a chronic problem.  The patient has been on lisinopril 5 mg daily for about a month.  She reports her blood pressures have been fairly well-controlled however continues to have elevated diastolic readings 90s-100s.  She reports she is tolerating lisinopril well with no side effects. The patient denies chest pain, shortness of breath, headaches, vision changes, lightheadedness, dizziness.      Class 1 obesity due to excess calories with serious comorbidity in adult  This is a chronic problem.  The patient is working on improving her diet, increasing her exercise and losing weight.  She has lost 6 pounds in the last 3 weeks.  She would like to continue her weight loss independently and declines referral to the health improvement program at this time.    Hidradenitis  This is a chronic problem.  The patient has had recurrent left axilla abscesses. She was admitted to [facility] about a month ago and underwent I&D by Dr. Wilkerson.  She had recurrent abscess development and underwent a second I&D by Dr. Wilkerson on 5/15/20. She saw Dr. Wilkerson yesterday for follow up. She is on a 7-day course of Keflex with which she reports compliance.  She denies any fevers, chills, or malaise.  She reports her left axilla is healing well without redness, swelling, or purulent drainage.      Past Medical History:   Diagnosis Date   • Hypertension    • Pneumonia        Social History     Tobacco Use   • Smoking status: Never Smoker   • Smokeless tobacco: Never Used   Substance Use Topics   • Alcohol use: Never     Frequency: Never   • Drug use: Never       Current Outpatient Medications Ordered in Epic   Medication Sig Dispense Refill   • lisinopril (PRINIVIL) 10 MG Tab Take 1 Tab by mouth every day. 90 Tab 2   • cephALEXin (KEFLEX) 500 MG Cap Take  "500 mg by mouth 4 times a day.     • amLODIPine (NORVASC) 10 MG Tab Take 1 Tab by mouth every day. 90 Tab 1     No current Epic-ordered facility-administered medications on file.        Allergies:  Patient has no known allergies.    Health Maintenance: The patient reports she had a Pap smear and mammogram per her OB/GYN about a month ago.    ROS:  Gen: no fevers/chills, no changes in weight  Eyes: no changes in vision  ENT: no sore throat, no hearing loss, no bloody nose  Pulm: no SOB  CV: no chest pain        Objective:       Exam:  /88 (BP Location: Left arm, Patient Position: Sitting, BP Cuff Size: Adult long)   Pulse 86   Temp 36.7 °C (98.1 °F) (Temporal)   Ht 1.6 m (5' 3\")   Wt 77 kg (169 lb 12.8 oz)   LMP 05/09/2020   SpO2 93%   BMI 30.08 kg/m²  Body mass index is 30.08 kg/m².    Gen: Alert and oriented, No apparent distress  Lungs: Normal effort, CTA bilaterally, no wheezes, rhonchi, or rales  CV: Regular rate and rhythm, no murmurs, rubs, or gallops  Ext: No clubbing, cyanosis, or edema  Left axilla  I&D incision healing well, no erythema, edema, purulent drainage, or warmth.    Assessment & Plan:     44 y.o. female with the following -     1. Essential hypertension  This is a chronic condition.  Her blood pressure continues to be mildly elevated on lisinopril 5 mg daily.  She is amenable to increasing her dose to 10 mg daily.  -Increase lisinopril to 10 mg daily  -Continue healthy diet, regular exercise, and weight loss  -Follow-up 1 month with home BP log    2. Class 1 obesity due to excess calories with serious comorbidity and body mass index (BMI) of 30.0 to 30.9 in adult  This is a chronic problem.  The patient has lost 6 pounds in the last 3 weeks.  -Continue healthy diet and regular exercise; offered referral to the health improvement program however patient declines at this time.    3. Hidradenitis  This is a chronic problem.  The patient has had recurrent abscesses in her left axilla " over the past month requiring I&D x2.  She is currently on Keflex x7 days.  We discussed the possibility of daily doxycycline therapy however the patient declines daily antibiotic therapy at this time as she reports this is the first abscess she has had in six years.  -Continue to monitor    Other orders  - lisinopril (PRINIVIL) 10 MG Tab; Take 1 Tab by mouth every day.  Dispense: 90 Tab; Refill: 2      Return in about 1 month (around 6/20/2020).    Please note this dictation was created using voice recognition software. I have made every reasonable attempt to correct obvious errors, but I expect there may be errors of grammar, and possibly content, that I did not discover before finalizing the note.

## 2020-06-17 ENCOUNTER — TELEMEDICINE (OUTPATIENT)
Dept: MEDICAL GROUP | Facility: MEDICAL CENTER | Age: 45
End: 2020-06-17
Payer: COMMERCIAL

## 2020-06-17 DIAGNOSIS — I10 ESSENTIAL HYPERTENSION: ICD-10-CM

## 2020-06-17 PROCEDURE — 99441 PR PHYSICIAN TELEPHONE EVALUATION 5-10 MIN: CPT | Mod: CR | Performed by: FAMILY MEDICINE

## 2020-06-17 RX ORDER — LOSARTAN POTASSIUM 50 MG/1
50 TABLET ORAL DAILY
Qty: 30 TAB | Refills: 2 | Status: SHIPPED | OUTPATIENT
Start: 2020-06-17 | End: 2020-08-24 | Stop reason: SDUPTHER

## 2020-06-17 NOTE — ASSESSMENT & PLAN NOTE
This is a chronic problem.  Patient has been on amlodipine 10 mg daily and lisinopril 10 mg daily.  Unfortunately she has developed a dry cough is likely due to lisinopril. She has not been taking home BP readings. The patient denies chest pain, shortness of breath, headaches, vision changes, lightheadedness, dizziness.

## 2020-06-17 NOTE — PROGRESS NOTES
Telephone Appointment Visit   As a means of avoiding spread of COVID-19, this visit is being conducted by telephone. This telephone visit was initiated by the patient and they verbally consented.      Time at start of call: 10:45AM    Reason for Call:  Lab Follow-up  HTN    Patient Comments / History:   Essential hypertension  This is a chronic problem.  Patient has been on amlodipine 10 mg daily and lisinopril 10 mg daily.  Unfortunately she has developed a dry cough is likely due to lisinopril. She has not been taking home BP readings. The patient denies chest pain, shortness of breath, headaches, vision changes, lightheadedness, dizziness.      Labs / Images Reviewed   None     Assessment and Plan:     44 y.o. female with the following -     1. Essential hypertension  This is a chronic problem.  The patient is taking amlodipine 10 mg daily and lisinopril 10 mg daily.  Unfortunately she has developed a dry cough, likely secondary to lisinopril.  We will continue her amlodipine and switch her to losartan 50 mg daily. She will stop lisinopril.  Patient to keep home BP log and bring to clinic in 2 weeks.    Other orders  - losartan (COZAAR) 50 MG Tab; Take 1 Tab by mouth every day.  Dispense: 30 Tab; Refill: 2      Return in about 2 weeks (around 7/1/2020).    Please note this dictation was created using voice recognition software. I have made every reasonable attempt to correct obvious errors, but I expect there may be errors of grammar, and possibly content, that I did not discover before finalizing the note.     Time at end of call: 10:50PM  Total Time Spent: 5-10 minutes    Marily Brandt M.D.

## 2020-07-08 ENCOUNTER — OFFICE VISIT (OUTPATIENT)
Dept: MEDICAL GROUP | Facility: MEDICAL CENTER | Age: 45
End: 2020-07-08
Payer: COMMERCIAL

## 2020-07-08 VITALS
TEMPERATURE: 97.4 F | WEIGHT: 171.3 LBS | DIASTOLIC BLOOD PRESSURE: 88 MMHG | RESPIRATION RATE: 14 BRPM | OXYGEN SATURATION: 92 % | HEART RATE: 85 BPM | HEIGHT: 63 IN | SYSTOLIC BLOOD PRESSURE: 128 MMHG | BODY MASS INDEX: 30.35 KG/M2

## 2020-07-08 DIAGNOSIS — I10 ESSENTIAL HYPERTENSION: ICD-10-CM

## 2020-07-08 DIAGNOSIS — E66.09 CLASS 1 OBESITY DUE TO EXCESS CALORIES WITH SERIOUS COMORBIDITY AND BODY MASS INDEX (BMI) OF 30.0 TO 30.9 IN ADULT: ICD-10-CM

## 2020-07-08 DIAGNOSIS — L73.2 HIDRADENITIS: ICD-10-CM

## 2020-07-08 DIAGNOSIS — Z20.822 CLOSE EXPOSURE TO COVID-19 VIRUS: ICD-10-CM

## 2020-07-08 PROCEDURE — 99214 OFFICE O/P EST MOD 30 MIN: CPT | Performed by: FAMILY MEDICINE

## 2020-07-08 ASSESSMENT — FIBROSIS 4 INDEX: FIB4 SCORE: 0.53

## 2020-07-08 NOTE — PROGRESS NOTES
Subjective:     CC: f/u HTN    HPI:   Kacey presents today with:    Essential hypertension  This is a chronic problem.  Patient has been on amlodipine 10 mg daily and losartan 50mg daily (she was switched from lisinopril to losartan as she developed a cough secondary to lisinopril).  Her home BP readings have ranged from 108-130s/80-93. The patient denies chest pain, shortness of breath, headaches, vision changes, lightheadedness, dizziness.      Class 1 obesity due to excess calories with serious comorbidity in adult  This is a chronic problem.  The patient is working on improving her diet, increasing her exercise and losing weight.  She is walking for an hour about three times per week. She admits her diet needs improvement. She would like to continue her weight loss independently and declines referral to the health improvement program.     Hidradenitis  This is a chronic problem.  The patient has had recurrent left axilla abscesses. She was admitted to [facility] about a month ago and underwent I&D by Dr. Wilkerson.  She had recurrent abscess development and underwent a second I&D by Dr. Wilkerson on 5/15/20. She reports the incision site has healed well. She denies recurrent redness, swelling, or pain.      Past Medical History:   Diagnosis Date   • Hypertension    • Pneumonia        Social History     Tobacco Use   • Smoking status: Never Smoker   • Smokeless tobacco: Never Used   Substance Use Topics   • Alcohol use: Never     Frequency: Never   • Drug use: Never       Current Outpatient Medications Ordered in Epic   Medication Sig Dispense Refill   • losartan (COZAAR) 50 MG Tab Take 1 Tab by mouth every day. 30 Tab 2   • amLODIPine (NORVASC) 10 MG Tab Take 1 Tab by mouth every day. 90 Tab 1     No current Epic-ordered facility-administered medications on file.        Allergies:  Cindy [drospirenone-ethinyl estradiol]    Health Maintenance: Pap and mammogram completed through OBGYN    ROS:  Gen: no  "fevers/chills, no changes in weight  Eyes: no changes in vision  ENT: no sore throat, no hearing loss, no bloody nose  Pulm: no SOB  CV: no chest pain  GI: no nausea/vomiting, no diarrhea  : no dysuria  MSk: no myalgias  Skin: no rash  Neuro: no headaches, no numbness/tingling  Heme/Lymph: no easy bruising      Objective:       Exam:  /88 (BP Location: Left arm, Patient Position: Sitting, BP Cuff Size: Adult)   Pulse 85   Temp 36.3 °C (97.4 °F) (Temporal)   Resp 14   Ht 1.6 m (5' 3\")   Wt 77.7 kg (171 lb 4.8 oz)   LMP 07/07/2020 (Exact Date)   SpO2 92%   BMI 30.34 kg/m²  Body mass index is 30.34 kg/m².    Gen: Alert and oriented, No apparent distress  Neck: Neck is supple without lymphadenopathy  Lungs: Normal effort, CTA bilaterally, no wheezes, rhonchi, or rales  CV: Regular rate and rhythm, no murmurs, rubs, or gallops  Ext: No clubbing, cyanosis, or edema      Assessment & Plan:     44 y.o. female with the following -     1. Essential hypertension  This is a chronic problem.  The patient's blood pressure is fairly well controlled on amlodipine 10 mg daily and losartan 50 mg daily.  She does have occasional diastolic blood pressures in the low 90s.  Rather than increasing her medication we have agreed she will work on lifestyle modification.  We discussed the importance of improving her diet, increasing her exercise, and working on weight loss.  -Continue current medication regimen  -Continue home blood pressure log    2. Class 1 obesity due to excess calories with serious comorbidity and body mass index (BMI) of 30.0 to 30.9 in adult  This is a chronic problem.  The patient is working on increasing her exercise.  She admits she needs to improve her diet.  - Discussed recommendation for diet rich in vegetables, fruits, fiber, minimal processed/packaged foods, and 150 minutes of moderate exercise per week.    3. Hidradenitis  Is a chronic problem.  The patient has had recurrent abscesses in her " left axilla over the past month requiring I&D x2.  Her incision has healed well and she denies recurrent erythema, edema, or pain.  We have discussed daily doxycycline therapy however the patient declines as this was her first recurrence in six years.    Return in about 1 month (around 8/8/2020).    Please note this dictation was created using voice recognition software. I have made every reasonable attempt to correct obvious errors, but I expect there may be errors of grammar, and possibly content, that I did not discover before finalizing the note.

## 2020-07-08 NOTE — ASSESSMENT & PLAN NOTE
This is a chronic problem.  The patient has had recurrent left axilla abscesses. She was admitted to [facility] about a month ago and underwent I&D by Dr. Wilkerson.  She had recurrent abscess development and underwent a second I&D by Dr. Wilkerson on 5/15/20. She reports the incision site has healed well. She denies recurrent redness, swelling, or pain.

## 2020-07-08 NOTE — ASSESSMENT & PLAN NOTE
This is a chronic problem.  The patient is working on improving her diet, increasing her exercise and losing weight.  She is walking for an hour about three times per week. She admits her diet needs improvement. She would like to continue her weight loss independently and declines referral to the health improvement program.

## 2020-07-08 NOTE — ASSESSMENT & PLAN NOTE
This is a chronic problem.  Patient has been on amlodipine 10 mg daily and losartan 50mg daily (she was switched from lisinopril to losartan as she developed a cough secondary to lisinopril).  Her home BP readings have ranged from 108-130s/80-93. The patient denies chest pain, shortness of breath, headaches, vision changes, lightheadedness, dizziness.

## 2020-08-24 ENCOUNTER — OFFICE VISIT (OUTPATIENT)
Dept: MEDICAL GROUP | Facility: MEDICAL CENTER | Age: 45
End: 2020-08-24
Payer: COMMERCIAL

## 2020-08-24 VITALS
OXYGEN SATURATION: 92 % | DIASTOLIC BLOOD PRESSURE: 88 MMHG | SYSTOLIC BLOOD PRESSURE: 110 MMHG | HEART RATE: 86 BPM | HEIGHT: 63 IN | RESPIRATION RATE: 14 BRPM | WEIGHT: 170.8 LBS | TEMPERATURE: 98.2 F | BODY MASS INDEX: 30.26 KG/M2

## 2020-08-24 DIAGNOSIS — I10 ESSENTIAL HYPERTENSION: ICD-10-CM

## 2020-08-24 DIAGNOSIS — E78.5 DYSLIPIDEMIA: ICD-10-CM

## 2020-08-24 DIAGNOSIS — E66.09 CLASS 1 OBESITY DUE TO EXCESS CALORIES WITH SERIOUS COMORBIDITY AND BODY MASS INDEX (BMI) OF 30.0 TO 30.9 IN ADULT: ICD-10-CM

## 2020-08-24 PROCEDURE — 99214 OFFICE O/P EST MOD 30 MIN: CPT | Performed by: FAMILY MEDICINE

## 2020-08-24 RX ORDER — LOSARTAN POTASSIUM 50 MG/1
50 TABLET ORAL DAILY
Qty: 90 TAB | Refills: 2 | Status: SHIPPED | OUTPATIENT
Start: 2020-08-24 | End: 2021-08-20

## 2020-08-24 RX ORDER — AMLODIPINE BESYLATE 10 MG/1
10 TABLET ORAL DAILY
Qty: 90 TAB | Refills: 2 | Status: SHIPPED | OUTPATIENT
Start: 2020-08-24 | End: 2021-11-19

## 2020-08-24 ASSESSMENT — FIBROSIS 4 INDEX: FIB4 SCORE: 0.54

## 2020-08-24 NOTE — ASSESSMENT & PLAN NOTE
This is a chronic problem.  The patient's recent lipid panel reveals elevated LDL per below.  The patient is trying to decrease her red meat intake.    Lab Results   Component Value Date/Time    CHOLSTRLTOT 213 (H) 05/07/2020 10:58 AM     (H) 05/07/2020 10:58 AM    HDL 47 05/07/2020 10:58 AM    TRIGLYCERIDE 145 05/07/2020 10:58 AM

## 2020-08-24 NOTE — ASSESSMENT & PLAN NOTE
This is a chronic problem.  The patient reports compliance with amlodipine 10 mg daily and losartan 50 mg daily.  Her home blood pressure readings are in the 110s-115/80s-90. The patient denies chest pain, shortness of breath, headaches, vision changes, lightheadedness, dizziness, or medication side effects.

## 2020-08-24 NOTE — PROGRESS NOTES
Subjective:     CC: f/u HTN    HPI:   Kacey presents today with:    Essential hypertension  This is a chronic problem.  The patient reports compliance with amlodipine 10 mg daily and losartan 50 mg daily.  Her home blood pressure readings are in the 110s-115/80s-90. The patient denies chest pain, shortness of breath, headaches, vision changes, lightheadedness, dizziness, or medication side effects.      Class 1 obesity due to excess calories with serious comorbidity in adult  This is a chronic problem.  The patient continues to work on improving her diet.  She is walking several days a week.  We have discussed the health improvement program in the past however the patient requests to work on weight loss independently.    Dyslipidemia  This is a chronic problem.  The patient's recent lipid panel reveals elevated LDL per below.  The patient is trying to decrease her red meat intake.    Lab Results   Component Value Date/Time    CHOLSTRLTOT 213 (H) 05/07/2020 10:58 AM     (H) 05/07/2020 10:58 AM    HDL 47 05/07/2020 10:58 AM    TRIGLYCERIDE 145 05/07/2020 10:58 AM             Past Medical History:   Diagnosis Date   • Hypertension    • Pneumonia        Social History     Tobacco Use   • Smoking status: Never Smoker   • Smokeless tobacco: Never Used   Substance Use Topics   • Alcohol use: Never     Frequency: Never   • Drug use: Never       Current Outpatient Medications Ordered in Epic   Medication Sig Dispense Refill   • amLODIPine (NORVASC) 10 MG Tab Take 1 Tab by mouth every day. 90 Tab 2   • losartan (COZAAR) 50 MG Tab Take 1 Tab by mouth every day. 90 Tab 2     No current Epic-ordered facility-administered medications on file.        Allergies:  Cindy [drospirenone-ethinyl estradiol]    Health Maintenance: Patient reports her pap and mammogram were completed 4/2020 through her OBGYN    ROS:  Gen: no fevers/chills, no changes in weight  Eyes: no changes in vision  ENT: no sore throat, no hearing loss, no  "bloody nose  Pulm: no SOB  CV: no chest pain    Objective:       Exam:  /88 (BP Location: Left arm, Patient Position: Sitting, BP Cuff Size: Adult)   Pulse 86   Temp 36.8 °C (98.2 °F) (Temporal)   Resp 14   Ht 1.6 m (5' 3\")   Wt 77.5 kg (170 lb 12.8 oz)   LMP 08/10/2020   SpO2 92%   BMI 30.26 kg/m²  Body mass index is 30.26 kg/m².    Gen: Alert and oriented, No apparent distress  Lungs: Normal effort, CTA bilaterally, no wheezes, rhonchi, or rales  CV: Regular rate and rhythm, no murmurs, rubs, or gallops      Assessment & Plan:     45 y.o. female with the following -     1. Essential hypertension  This is a chronic problem.  The patient's blood pressure is at goal on amlodipine 10 mg daily and losartan 50 mg daily.  - amLODIPine (NORVASC) 10 MG Tab; Take 1 Tab by mouth every day.  Dispense: 90 Tab; Refill: 2  - losartan (COZAAR) 50 MG Tab; Take 1 Tab by mouth every day.  Dispense: 90 Tab; Refill: 2  - Comp Metabolic Panel; Future  - Lipid Profile; Future  - HEMOGLOBIN A1C; Future  - CBC WITH DIFFERENTIAL; Future  - MICROALBUMIN CREAT RATIO URINE; Future    2. Dyslipidemia  This is a chronic problem.  Most recent  on 5/7/20. The patient is working on cutting down red meat.   - Continue decreasing red meat; recommended Mediterranean type diet  - Comp Metabolic Panel; Future  - Lipid Profile; Future  - HEMOGLOBIN A1C; Future    3. Class 1 obesity due to excess calories with serious comorbidity and body mass index (BMI) of 30.0 to 30.9 in adult  This is a chronic problem.  We have discussed the health improvement program and previously and the patient wishes to work on weight loss independently.  - Discussed recommendation for diet rich in vegetables, fruits, fiber, minimal processed/packaged foods, and 150 minutes of moderate exercise per week.        Return in about 6 months (around 2/24/2021) for F/U lab, HTN.    Please note this dictation was created using voice recognition software. I have " made every reasonable attempt to correct obvious errors, but I expect there may be errors of grammar, and possibly content, that I did not discover before finalizing the note.

## 2020-08-24 NOTE — ASSESSMENT & PLAN NOTE
This is a chronic problem.  The patient continues to work on improving her diet.  She is walking several days a week.  We have discussed the health improvement program in the past however the patient requests to work on weight loss independently.

## 2021-06-25 ENCOUNTER — OFFICE VISIT (OUTPATIENT)
Dept: MEDICAL GROUP | Facility: MEDICAL CENTER | Age: 46
End: 2021-06-25

## 2021-06-25 VITALS
TEMPERATURE: 98.6 F | OXYGEN SATURATION: 95 % | DIASTOLIC BLOOD PRESSURE: 82 MMHG | SYSTOLIC BLOOD PRESSURE: 128 MMHG | HEIGHT: 63 IN | WEIGHT: 182.1 LBS | RESPIRATION RATE: 16 BRPM | HEART RATE: 88 BPM | BODY MASS INDEX: 32.27 KG/M2

## 2021-06-25 DIAGNOSIS — I10 ESSENTIAL HYPERTENSION: ICD-10-CM

## 2021-06-25 PROCEDURE — 99213 OFFICE O/P EST LOW 20 MIN: CPT | Performed by: FAMILY MEDICINE

## 2021-06-25 ASSESSMENT — PATIENT HEALTH QUESTIONNAIRE - PHQ9: CLINICAL INTERPRETATION OF PHQ2 SCORE: 0

## 2021-06-25 ASSESSMENT — FIBROSIS 4 INDEX: FIB4 SCORE: 0.54

## 2021-06-25 NOTE — ASSESSMENT & PLAN NOTE
Patient's blood pressure has been well-controlled on amlodipine 10mg daily and losartan 50mg daily. She presents today to discuss work accomodation as she feels her blood pressure increases during certain activities at work. 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 Form completed for patient today, scanned into media.

## 2021-06-25 NOTE — PROGRESS NOTES
"Subjective:     CC: HTN, form completion    HPI:   Kacey presents today with:    Essential hypertension  Patient's blood pressure has been well-controlled on amlodipine 10mg daily and losartan 50mg daily. She presents today to discuss work accomodation as she feels her blood pressure increases during certain activities at work. 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 Form completed for patient today, scanned into media.      Past Medical History:   Diagnosis Date   • Hypertension    • Pneumonia        Social History     Tobacco Use   • Smoking status: Never Smoker   • Smokeless tobacco: Never Used   Vaping Use   • Vaping Use: Never used   Substance Use Topics   • Alcohol use: Never   • Drug use: Never       Current Outpatient Medications Ordered in Epic   Medication Sig Dispense Refill   • amLODIPine (NORVASC) 10 MG Tab Take 1 Tab by mouth every day. 90 Tab 2   • losartan (COZAAR) 50 MG Tab Take 1 Tab by mouth every day. 90 Tab 2     No current Epic-ordered facility-administered medications on file.       Allergies:  Cindy [drospirenone-ethinyl estradiol]    Health Maintenance: Pap and mammogram completed through [facility] 4/2020; declines immunizations    ROS:  Gen: no fevers/chills, no changes in weight  Eyes: no changes in vision  ENT: no sore throat, no hearing loss, no bloody nose  Pulm: no SOB  CV: no chest pain      Objective:       Exam:  /82 (BP Location: Left arm, Patient Position: Sitting, BP Cuff Size: Adult long)   Pulse 88   Temp 37 °C (98.6 °F) (Temporal)   Resp 16   Ht 1.6 m (5' 3\")   Wt 82.6 kg (182 lb 1.6 oz)   LMP 06/25/2021   SpO2 95%   BMI 32.26 kg/m²  Body mass index is 32.26 kg/m².    Constitutional: Alert, no distress, well-groomed  Skin: Warm, dry, good turgor, no rashes in visible areas  Eyes: Equal, round and reactive, conjunctiva clear, no ptosis  Respiratory: Unlabored respiratory effort  Psych: " Alert and oriented, normal affect and mood      Assessment & Plan:     45 y.o. female with the following -     1. Essential hypertension  - Continue amlodipine 10mg daily, losartan 50mg daily  - Form regarding work restrictions completed, scanned to media    Return in about 6 months (around 12/25/2021).    Please note this dictation was created using voice recognition software. I have made every reasonable attempt to correct obvious errors, but I expect there may be errors of grammar, and possibly content, that I did not discover before finalizing the note.

## 2021-11-17 DIAGNOSIS — I10 ESSENTIAL HYPERTENSION: ICD-10-CM

## 2021-11-19 RX ORDER — AMLODIPINE BESYLATE 10 MG/1
TABLET ORAL
Qty: 90 TABLET | Refills: 0 | Status: SHIPPED | OUTPATIENT
Start: 2021-11-19 | End: 2022-02-15

## 2022-11-03 ENCOUNTER — HOSPITAL ENCOUNTER (OUTPATIENT)
Facility: MEDICAL CENTER | Age: 47
End: 2022-11-03
Attending: FAMILY MEDICINE
Payer: COMMERCIAL

## 2022-11-03 ENCOUNTER — OFFICE VISIT (OUTPATIENT)
Dept: MEDICAL GROUP | Facility: MEDICAL CENTER | Age: 47
End: 2022-11-03
Payer: COMMERCIAL

## 2022-11-03 VITALS
SYSTOLIC BLOOD PRESSURE: 130 MMHG | DIASTOLIC BLOOD PRESSURE: 70 MMHG | HEIGHT: 64 IN | TEMPERATURE: 97.7 F | OXYGEN SATURATION: 98 % | HEART RATE: 76 BPM | RESPIRATION RATE: 17 BRPM | BODY MASS INDEX: 33.12 KG/M2 | WEIGHT: 194 LBS

## 2022-11-03 DIAGNOSIS — E66.09 CLASS 1 OBESITY DUE TO EXCESS CALORIES WITH SERIOUS COMORBIDITY AND BODY MASS INDEX (BMI) OF 33.0 TO 33.9 IN ADULT: ICD-10-CM

## 2022-11-03 DIAGNOSIS — I10 ESSENTIAL HYPERTENSION: ICD-10-CM

## 2022-11-03 DIAGNOSIS — Z12.31 ENCOUNTER FOR SCREENING MAMMOGRAM FOR BREAST CANCER: ICD-10-CM

## 2022-11-03 DIAGNOSIS — E78.5 DYSLIPIDEMIA: ICD-10-CM

## 2022-11-03 DIAGNOSIS — R35.0 URINARY FREQUENCY: ICD-10-CM

## 2022-11-03 DIAGNOSIS — R73.03 PREDIABETES: ICD-10-CM

## 2022-11-03 DIAGNOSIS — Z23 NEED FOR VACCINATION: ICD-10-CM

## 2022-11-03 LAB
APPEARANCE UR: CLEAR
BILIRUB UR STRIP-MCNC: NORMAL MG/DL
COLOR UR AUTO: YELLOW
GLUCOSE UR STRIP.AUTO-MCNC: NORMAL MG/DL
KETONES UR STRIP.AUTO-MCNC: NORMAL MG/DL
LEUKOCYTE ESTERASE UR QL STRIP.AUTO: NORMAL
NITRITE UR QL STRIP.AUTO: NORMAL
PH UR STRIP.AUTO: 7.5 [PH] (ref 5–8)
PROT UR QL STRIP: NORMAL MG/DL
RBC UR QL AUTO: NORMAL
SP GR UR STRIP.AUTO: 1.01
UROBILINOGEN UR STRIP-MCNC: 0.2 MG/DL

## 2022-11-03 PROCEDURE — 81002 URINALYSIS NONAUTO W/O SCOPE: CPT | Performed by: FAMILY MEDICINE

## 2022-11-03 PROCEDURE — 90686 IIV4 VACC NO PRSV 0.5 ML IM: CPT | Performed by: FAMILY MEDICINE

## 2022-11-03 PROCEDURE — 99214 OFFICE O/P EST MOD 30 MIN: CPT | Mod: 25 | Performed by: FAMILY MEDICINE

## 2022-11-03 PROCEDURE — 87086 URINE CULTURE/COLONY COUNT: CPT

## 2022-11-03 PROCEDURE — 90471 IMMUNIZATION ADMIN: CPT | Performed by: FAMILY MEDICINE

## 2022-11-03 RX ORDER — LOSARTAN POTASSIUM 50 MG/1
50 TABLET ORAL DAILY
Qty: 90 TABLET | Refills: 3 | Status: SHIPPED | OUTPATIENT
Start: 2022-11-03 | End: 2023-05-01 | Stop reason: SDUPTHER

## 2022-11-03 RX ORDER — NITROFURANTOIN 25; 75 MG/1; MG/1
100 CAPSULE ORAL 2 TIMES DAILY
Qty: 10 CAPSULE | Refills: 0 | Status: SHIPPED | OUTPATIENT
Start: 2022-11-03 | End: 2022-11-08

## 2022-11-03 RX ORDER — AMLODIPINE BESYLATE 10 MG/1
10 TABLET ORAL DAILY
Qty: 90 TABLET | Refills: 3 | Status: SHIPPED | OUTPATIENT
Start: 2022-11-03 | End: 2023-06-07 | Stop reason: SDUPTHER

## 2022-11-03 SDOH — ECONOMIC STABILITY: HOUSING INSECURITY
IN THE LAST 12 MONTHS, WAS THERE A TIME WHEN YOU DID NOT HAVE A STEADY PLACE TO SLEEP OR SLEPT IN A SHELTER (INCLUDING NOW)?: NO

## 2022-11-03 SDOH — ECONOMIC STABILITY: TRANSPORTATION INSECURITY
IN THE PAST 12 MONTHS, HAS THE LACK OF TRANSPORTATION KEPT YOU FROM MEDICAL APPOINTMENTS OR FROM GETTING MEDICATIONS?: NO

## 2022-11-03 SDOH — HEALTH STABILITY: PHYSICAL HEALTH: ON AVERAGE, HOW MANY MINUTES DO YOU ENGAGE IN EXERCISE AT THIS LEVEL?: PATIENT DECLINED

## 2022-11-03 SDOH — ECONOMIC STABILITY: HOUSING INSECURITY: IN THE LAST 12 MONTHS, HOW MANY PLACES HAVE YOU LIVED?: 1

## 2022-11-03 SDOH — ECONOMIC STABILITY: FOOD INSECURITY: WITHIN THE PAST 12 MONTHS, THE FOOD YOU BOUGHT JUST DIDN'T LAST AND YOU DIDN'T HAVE MONEY TO GET MORE.: NEVER TRUE

## 2022-11-03 SDOH — HEALTH STABILITY: MENTAL HEALTH
STRESS IS WHEN SOMEONE FEELS TENSE, NERVOUS, ANXIOUS, OR CAN'T SLEEP AT NIGHT BECAUSE THEIR MIND IS TROUBLED. HOW STRESSED ARE YOU?: NOT AT ALL

## 2022-11-03 SDOH — ECONOMIC STABILITY: TRANSPORTATION INSECURITY
IN THE PAST 12 MONTHS, HAS LACK OF RELIABLE TRANSPORTATION KEPT YOU FROM MEDICAL APPOINTMENTS, MEETINGS, WORK OR FROM GETTING THINGS NEEDED FOR DAILY LIVING?: NO

## 2022-11-03 SDOH — ECONOMIC STABILITY: INCOME INSECURITY: IN THE LAST 12 MONTHS, WAS THERE A TIME WHEN YOU WERE NOT ABLE TO PAY THE MORTGAGE OR RENT ON TIME?: NO

## 2022-11-03 SDOH — ECONOMIC STABILITY: FOOD INSECURITY: WITHIN THE PAST 12 MONTHS, YOU WORRIED THAT YOUR FOOD WOULD RUN OUT BEFORE YOU GOT MONEY TO BUY MORE.: NEVER TRUE

## 2022-11-03 SDOH — HEALTH STABILITY: PHYSICAL HEALTH
ON AVERAGE, HOW MANY DAYS PER WEEK DO YOU ENGAGE IN MODERATE TO STRENUOUS EXERCISE (LIKE A BRISK WALK)?: PATIENT DECLINED

## 2022-11-03 SDOH — ECONOMIC STABILITY: INCOME INSECURITY: HOW HARD IS IT FOR YOU TO PAY FOR THE VERY BASICS LIKE FOOD, HOUSING, MEDICAL CARE, AND HEATING?: NOT HARD AT ALL

## 2022-11-03 SDOH — ECONOMIC STABILITY: TRANSPORTATION INSECURITY
IN THE PAST 12 MONTHS, HAS LACK OF TRANSPORTATION KEPT YOU FROM MEETINGS, WORK, OR FROM GETTING THINGS NEEDED FOR DAILY LIVING?: NO

## 2022-11-03 ASSESSMENT — LIFESTYLE VARIABLES
SKIP TO QUESTIONS 9-10: 1
HOW OFTEN DO YOU HAVE A DRINK CONTAINING ALCOHOL: NEVER
HOW OFTEN DO YOU HAVE SIX OR MORE DRINKS ON ONE OCCASION: NEVER
AUDIT-C TOTAL SCORE: 0
HOW MANY STANDARD DRINKS CONTAINING ALCOHOL DO YOU HAVE ON A TYPICAL DAY: PATIENT DOES NOT DRINK

## 2022-11-03 ASSESSMENT — SOCIAL DETERMINANTS OF HEALTH (SDOH)
HOW HARD IS IT FOR YOU TO PAY FOR THE VERY BASICS LIKE FOOD, HOUSING, MEDICAL CARE, AND HEATING?: NOT HARD AT ALL
DO YOU BELONG TO ANY CLUBS OR ORGANIZATIONS SUCH AS CHURCH GROUPS UNIONS, FRATERNAL OR ATHLETIC GROUPS, OR SCHOOL GROUPS?: NO
HOW OFTEN DO YOU HAVE A DRINK CONTAINING ALCOHOL: NEVER
HOW OFTEN DO YOU ATTEND CHURCH OR RELIGIOUS SERVICES?: NEVER
HOW OFTEN DO YOU ATTEND CHURCH OR RELIGIOUS SERVICES?: NEVER
HOW OFTEN DO YOU GET TOGETHER WITH FRIENDS OR RELATIVES?: TWICE A WEEK
IN A TYPICAL WEEK, HOW MANY TIMES DO YOU TALK ON THE PHONE WITH FAMILY, FRIENDS, OR NEIGHBORS?: ONCE A WEEK
IN A TYPICAL WEEK, HOW MANY TIMES DO YOU TALK ON THE PHONE WITH FAMILY, FRIENDS, OR NEIGHBORS?: ONCE A WEEK
HOW OFTEN DO YOU ATTENT MEETINGS OF THE CLUB OR ORGANIZATION YOU BELONG TO?: NEVER
HOW OFTEN DO YOU HAVE SIX OR MORE DRINKS ON ONE OCCASION: NEVER
DO YOU BELONG TO ANY CLUBS OR ORGANIZATIONS SUCH AS CHURCH GROUPS UNIONS, FRATERNAL OR ATHLETIC GROUPS, OR SCHOOL GROUPS?: NO
HOW MANY DRINKS CONTAINING ALCOHOL DO YOU HAVE ON A TYPICAL DAY WHEN YOU ARE DRINKING: PATIENT DOES NOT DRINK
HOW OFTEN DO YOU ATTENT MEETINGS OF THE CLUB OR ORGANIZATION YOU BELONG TO?: NEVER
WITHIN THE PAST 12 MONTHS, YOU WORRIED THAT YOUR FOOD WOULD RUN OUT BEFORE YOU GOT THE MONEY TO BUY MORE: NEVER TRUE
HOW OFTEN DO YOU GET TOGETHER WITH FRIENDS OR RELATIVES?: TWICE A WEEK

## 2022-11-03 ASSESSMENT — ENCOUNTER SYMPTOMS
FLANK PAIN: 0
ABDOMINAL PAIN: 0
DIARRHEA: 0
COUGH: 0
HEARTBURN: 0
SHORTNESS OF BREATH: 0
SORE THROAT: 0
BLOOD IN STOOL: 0
CHILLS: 0
CONSTIPATION: 0
FEVER: 0
WHEEZING: 0
PALPITATIONS: 0
NAUSEA: 0
VOMITING: 0

## 2022-11-03 ASSESSMENT — PATIENT HEALTH QUESTIONNAIRE - PHQ9: CLINICAL INTERPRETATION OF PHQ2 SCORE: 0

## 2022-11-03 NOTE — PROGRESS NOTES
FAMILY MEDICINE VISIT                                                               Chief complaint::Diagnoses of Dyslipidemia, Essential hypertension, Prediabetes, Need for vaccination, Encounter for screening mammogram for breast cancer, Class 1 obesity due to excess calories with serious comorbidity and body mass index (BMI) of 33.0 to 33.9 in adult, and Urinary frequency were pertinent to this visit.    History of present illness: Kacey No is a 47 y.o. female who presented for blood pressure, dyslipidemia, prediabetes, urinary frequency.    Problem   Prediabetes    Last A1c came back at 5.5 which improved from 5.8.      Component      Latest Ref Rng & Units 4/17/2020 5/7/2020           3:06 AM 10:58 AM   Glycohemoglobin      0.0 - 5.6 % 5.8 (H) 5.5   Estim. Avg Glu      mg/dL 120 111        Urinary Frequency    She is having urinary frequency since last few days.  Denies any burning urination, blood in urine, urgency, pelvic pain.     Dyslipidemia    Last cholesterol panel showed elevated total cholesterol and LDL level.  She is currently not on any medications.    The 10-year ASCVD risk score (Sally CHEEMA, et al., 2019) is: 1.6%    Lab Results   Component Value Date/Time    CHOLSTRLTOT 213 (H) 05/07/2020 1058    TRIGLYCERIDE 145 05/07/2020 1058    HDL 47 05/07/2020 1058     (H) 05/07/2020 1058        Essential Hypertension    Initial blood pressure was mildly elevated at 142/70, repeat came back at 130/70.  She is on amlodipine 10 mg daily and losartan 50 mg daily.  No side effects with medication.  No chest pain, palpitations, shortness of breath, lower leg swelling.  She requested refill for these prescriptions.     Class 1 Obesity Due to Excess Calories With Serious Comorbidity in Adult    Current BMI at 33.30.  She reports that she does not eat healthy diet.  She eats white rice with every meal.  She is physically active at work          Review of systems:     Review of Systems  "  Constitutional:  Negative for chills, fever and malaise/fatigue.   HENT:  Negative for congestion, hearing loss and sore throat.    Respiratory:  Negative for cough, shortness of breath and wheezing.    Cardiovascular:  Negative for chest pain, palpitations and leg swelling.   Gastrointestinal:  Negative for abdominal pain, blood in stool, constipation, diarrhea, heartburn, nausea and vomiting.   Genitourinary:  Positive for frequency. Negative for dysuria, flank pain, hematuria and urgency.   Skin:  Negative for rash.      Medications and Allergies:     Current Outpatient Medications   Medication Sig Dispense Refill    nitrofurantoin (MACROBID) 100 MG Cap Take 1 Capsule by mouth 2 times a day for 5 days. 10 Capsule 0    losartan (COZAAR) 50 MG Tab Take 1 Tablet by mouth every day. 90 Tablet 3    amLODIPine (NORVASC) 10 MG Tab Take 1 Tablet by mouth every day. 90 Tablet 3     No current facility-administered medications for this visit.          Vitals:    /70   Pulse 76   Temp 36.5 °C (97.7 °F)   Resp 17   Ht 1.626 m (5' 4\")   Wt 88 kg (194 lb 0.1 oz)   SpO2 98%  Body mass index is 33.3 kg/m².    Physical Exam:     Physical Exam  Constitutional:       General: She is not in acute distress.     Appearance: Normal appearance. She is not ill-appearing.   HENT:      Head: Normocephalic and atraumatic.   Eyes:      Conjunctiva/sclera: Conjunctivae normal.   Cardiovascular:      Rate and Rhythm: Normal rate and regular rhythm.      Heart sounds: Normal heart sounds. No murmur heard.    No friction rub. No gallop.   Pulmonary:      Effort: Pulmonary effort is normal. No respiratory distress.      Breath sounds: Normal breath sounds. No wheezing or rales.   Musculoskeletal:         General: No deformity.      Cervical back: Neck supple.   Skin:     Findings: No rash.   Neurological:      Mental Status: She is alert.      Gait: Gait is intact.   Psychiatric:         Mood and Affect: Mood and affect normal.    "      Judgment: Judgment normal.            Assessment/Plan:         Problem List Items Addressed This Visit       Urinary frequency     New problem, point-of-care urinalysis showed trace leukocytes and trace blood.  Start Macrobid 1 tablet 2 times daily for 5 days.  Send this for urine culture.         Relevant Medications    nitrofurantoin (MACROBID) 100 MG Cap    Other Relevant Orders    POCT Urinalysis (Completed)    URINE CULTURE(NEW)    Prediabetes     Chronic problem, stable based on last labs, recheck labs to assess control         Relevant Orders    HEMOGLOBIN A1C    Essential hypertension     Chronic problem, stable, continue same medication regimen.  Recommended to monitor blood pressure at home and bring log at next visit.         Relevant Medications    losartan (COZAAR) 50 MG Tab    amLODIPine (NORVASC) 10 MG Tab    Other Relevant Orders    Comp Metabolic Panel    Dyslipidemia     Chronic problem, uncontrolled based on last labs, recheck labs to assess control.  Eat healthy diet and exercise 150 minutes in the week.         Relevant Orders    Comp Metabolic Panel    Lipid Profile    TSH WITH REFLEX TO FT4    Class 1 obesity due to excess calories with serious comorbidity in adult     Chronic problem, uncontrolled, counseled regarding healthy diet and exercise.          Other Visit Diagnoses       Need for vaccination        Relevant Orders    INFLUENZA VACCINE QUAD INJ (PF) (Completed)    Encounter for screening mammogram for breast cancer        Relevant Orders    MA-SCREENING MAMMO BILAT W/TOMOSYNTHESIS W/CAD             Please note that this dictation was created using voice recognition software. I have made every reasonable attempt to correct obvious errors, but I expect that there are errors of grammar and possibly content that I did not discover before finalizing the note.    Follow up in 6 weeks for annual for Pap and lab follow-up

## 2022-11-03 NOTE — ASSESSMENT & PLAN NOTE
New problem, point-of-care urinalysis showed trace leukocytes and trace blood.  Start Macrobid 1 tablet 2 times daily for 5 days.  Send this for urine culture.

## 2022-11-03 NOTE — ASSESSMENT & PLAN NOTE
Chronic problem, uncontrolled based on last labs, recheck labs to assess control.  Eat healthy diet and exercise 150 minutes in the week.

## 2022-11-03 NOTE — ASSESSMENT & PLAN NOTE
Chronic problem, stable, continue same medication regimen.  Recommended to monitor blood pressure at home and bring log at next visit.

## 2022-11-06 LAB
BACTERIA UR CULT: NORMAL
SIGNIFICANT IND 70042: NORMAL
SITE SITE: NORMAL
SOURCE SOURCE: NORMAL

## 2022-12-02 ENCOUNTER — HOSPITAL ENCOUNTER (OUTPATIENT)
Dept: RADIOLOGY | Facility: MEDICAL CENTER | Age: 47
End: 2022-12-02
Attending: FAMILY MEDICINE
Payer: COMMERCIAL

## 2022-12-02 DIAGNOSIS — Z12.31 ENCOUNTER FOR SCREENING MAMMOGRAM FOR BREAST CANCER: ICD-10-CM

## 2022-12-02 PROCEDURE — 77063 BREAST TOMOSYNTHESIS BI: CPT

## 2022-12-10 ENCOUNTER — HOSPITAL ENCOUNTER (OUTPATIENT)
Dept: LAB | Facility: MEDICAL CENTER | Age: 47
End: 2022-12-10
Attending: FAMILY MEDICINE
Payer: COMMERCIAL

## 2022-12-10 DIAGNOSIS — E78.5 DYSLIPIDEMIA: ICD-10-CM

## 2022-12-10 LAB — TSH SERPL DL<=0.005 MIU/L-ACNC: 4.07 UIU/ML (ref 0.38–5.33)

## 2022-12-10 PROCEDURE — 84443 ASSAY THYROID STIM HORMONE: CPT

## 2022-12-10 PROCEDURE — 36415 COLL VENOUS BLD VENIPUNCTURE: CPT

## 2022-12-16 ENCOUNTER — HOSPITAL ENCOUNTER (OUTPATIENT)
Facility: MEDICAL CENTER | Age: 47
End: 2022-12-16
Attending: FAMILY MEDICINE
Payer: COMMERCIAL

## 2022-12-16 ENCOUNTER — OFFICE VISIT (OUTPATIENT)
Dept: MEDICAL GROUP | Facility: MEDICAL CENTER | Age: 47
End: 2022-12-16
Payer: COMMERCIAL

## 2022-12-16 VITALS
HEIGHT: 64 IN | WEIGHT: 187.39 LBS | SYSTOLIC BLOOD PRESSURE: 138 MMHG | HEART RATE: 81 BPM | OXYGEN SATURATION: 96 % | TEMPERATURE: 97.3 F | DIASTOLIC BLOOD PRESSURE: 80 MMHG | RESPIRATION RATE: 16 BRPM | BODY MASS INDEX: 31.99 KG/M2

## 2022-12-16 DIAGNOSIS — Z12.12 SCREENING FOR COLORECTAL CANCER: ICD-10-CM

## 2022-12-16 DIAGNOSIS — Z91.89 AT RISK FOR BREAST CANCER: ICD-10-CM

## 2022-12-16 DIAGNOSIS — I10 ESSENTIAL HYPERTENSION: ICD-10-CM

## 2022-12-16 DIAGNOSIS — Z11.59 NEED FOR HEPATITIS C SCREENING TEST: ICD-10-CM

## 2022-12-16 DIAGNOSIS — Z12.11 SCREENING FOR COLORECTAL CANCER: ICD-10-CM

## 2022-12-16 DIAGNOSIS — Z12.4 SCREENING FOR MALIGNANT NEOPLASM OF CERVIX: ICD-10-CM

## 2022-12-16 DIAGNOSIS — R73.03 PREDIABETES: ICD-10-CM

## 2022-12-16 DIAGNOSIS — Z01.419 ENCOUNTER FOR ANNUAL ROUTINE GYNECOLOGICAL EXAMINATION: ICD-10-CM

## 2022-12-16 DIAGNOSIS — Z23 NEED FOR VACCINATION: ICD-10-CM

## 2022-12-16 DIAGNOSIS — E78.5 DYSLIPIDEMIA: ICD-10-CM

## 2022-12-16 DIAGNOSIS — Z11.4 SCREENING FOR HIV (HUMAN IMMUNODEFICIENCY VIRUS): ICD-10-CM

## 2022-12-16 PROCEDURE — 90471 IMMUNIZATION ADMIN: CPT | Performed by: FAMILY MEDICINE

## 2022-12-16 PROCEDURE — 88175 CYTOPATH C/V AUTO FLUID REDO: CPT

## 2022-12-16 PROCEDURE — 90472 IMMUNIZATION ADMIN EACH ADD: CPT | Performed by: FAMILY MEDICINE

## 2022-12-16 PROCEDURE — 99396 PREV VISIT EST AGE 40-64: CPT | Mod: 25 | Performed by: FAMILY MEDICINE

## 2022-12-16 PROCEDURE — 87624 HPV HI-RISK TYP POOLED RSLT: CPT

## 2022-12-16 PROCEDURE — 90715 TDAP VACCINE 7 YRS/> IM: CPT | Performed by: FAMILY MEDICINE

## 2022-12-16 PROCEDURE — 99214 OFFICE O/P EST MOD 30 MIN: CPT | Mod: 25 | Performed by: FAMILY MEDICINE

## 2022-12-16 PROCEDURE — 90746 HEPB VACCINE 3 DOSE ADULT IM: CPT | Performed by: FAMILY MEDICINE

## 2022-12-16 ASSESSMENT — ENCOUNTER SYMPTOMS
NAUSEA: 0
WHEEZING: 0
NERVOUS/ANXIOUS: 0
EYE DISCHARGE: 0
HEMOPTYSIS: 0
SPUTUM PRODUCTION: 0
HEADACHES: 0
CONSTIPATION: 0
EYE REDNESS: 0
ABDOMINAL PAIN: 0
SHORTNESS OF BREATH: 0
FEVER: 0
PALPITATIONS: 0
DIARRHEA: 0
SENSORY CHANGE: 0
WEIGHT LOSS: 0
DEPRESSION: 0
EYE PAIN: 0
VOMITING: 0
SINUS PAIN: 0
COUGH: 0
FOCAL WEAKNESS: 0
MYALGIAS: 0
CHILLS: 0
DIZZINESS: 0

## 2022-12-16 NOTE — PROGRESS NOTES
47 y.o. female for annual routine gynecologic exam and lab follow-up.    Patient speaks Tagalog  language, used  Myla;ID:[id number]     Chief Complaint.  Chief Complaint   Patient presents with    Annual Exam  Labs for dyslipidemia, prediabetes, essential hypertension       History of present illness:    Problem   Prediabetes    Last A1c came back at 5.5 which improved from 5.8.      Component      Latest Ref Rng & Units 4/17/2020 5/7/2020           3:06 AM 10:58 AM   Glycohemoglobin      0.0 - 5.6 % 5.8 (H) 5.5   Estim. Avg Glu      mg/dL 120 111        Dyslipidemia    Last cholesterol panel showed elevated total cholesterol and LDL level.  She is currently not on any medications.    The 10-year ASCVD risk score (Sally CHEEMA, et al., 2019) is: 1.6%    Lab Results   Component Value Date/Time    CHOLSTRLTOT 213 (H) 05/07/2020 1058    TRIGLYCERIDE 145 05/07/2020 1058    HDL 47 05/07/2020 1058     (H) 05/07/2020 1058        Essential Hypertension    Blood pressure today came back at 138/80. She is on amlodipine 10 mg daily and losartan 50 mg daily.  No side effects with medication.  No chest pain, palpitations, shortness of breath, lower leg swelling.              Last Pap Smear: 2 to 3 years ago  History of abnormal pap smears. No  Gyn Surgery: None  Current Contraceptive Method:  None  Sexual history: currently sexually active    Last menstrual period:   Periods regular: Regular      Health Maintenance  Advanced directive: n/a  Osteoporosis Screen/ DEXA: n/a  PT/vit D for falls prevention: n/a   Cholesterol Screening: Last lipid panel showed elevated total cholesterol and LDL  Diabetes Screening: Last A1c at 5.5  AAA Screening (65 to 74 year male): n/a   Aspirin Use: N/A  Diet: Tries to eat healthy diet  Exercise: Is physically active  Substance Abuse: None  Safe in relationship.  Yes    Below anticipatory guidance discussed with patient  Seat belts, bike helmet, gun safety discussed.  Sun protection  use discussed.    Cancer screening  Colorectal Cancer Screening: Ordered referral to GI for colonoscopy  Lung Cancer Screening: She does not smoke  Cervical Cancer Screening: Pap smear done today  Breast Cancer Screening: Mammogram 12/2/2022 showed dense breast, repeat in 1 year.  Family history of breast cancer in sister.  Referral to [facility] for genetic testing    Infectious disease screening/Immunizations  --STI Screening: None  --Practices safe sex. Yes  --HIV Screening: Ordered HIV testing  --Hepatitis C Screening: Ordered hep v testing  --Immunizations: Tdap vaccine given today, first dose of HPV vaccine given today       Review of systems:    Review of Systems   Constitutional:  Negative for chills, fever, malaise/fatigue and weight loss.   HENT:  Negative for ear discharge, ear pain, hearing loss and sinus pain.    Eyes:  Negative for pain, discharge and redness.   Respiratory:  Negative for cough, hemoptysis, sputum production, shortness of breath and wheezing.    Cardiovascular:  Negative for chest pain, palpitations and leg swelling.   Gastrointestinal:  Negative for abdominal pain, constipation, diarrhea, nausea and vomiting.   Genitourinary:  Negative for dysuria, frequency and urgency.   Musculoskeletal:  Negative for joint pain and myalgias.   Skin:  Negative for itching and rash.   Neurological:  Negative for dizziness, sensory change, focal weakness and headaches.   Psychiatric/Behavioral:  Negative for depression. The patient is not nervous/anxious.       Medications and Allergies:     Current Outpatient Medications   Medication Sig Dispense Refill    losartan (COZAAR) 50 MG Tab Take 1 Tablet by mouth every day. 90 Tablet 3    amLODIPine (NORVASC) 10 MG Tab Take 1 Tablet by mouth every day. 90 Tablet 3     No current facility-administered medications for this visit.        Allergies   Allergen Reactions    Cindy [Drospirenone-Ethinyl Estradiol]      Asthma, cough       Vitals:    BP  "138/80 (BP Location: Left arm, Patient Position: Sitting, BP Cuff Size: Adult)   Pulse 81   Temp 36.3 °C (97.3 °F)   Resp 16   Ht 1.626 m (5' 4\")   Wt 85 kg (187 lb 6.3 oz)   SpO2 96%  Body mass index is 32.17 kg/m².    Physical Exam:   Physical Exam  Constitutional:       General: She is not in acute distress.     Appearance: She is not diaphoretic.   HENT:      Head: Normocephalic and atraumatic.      Right Ear: Tympanic membrane, ear canal and external ear normal.      Left Ear: Tympanic membrane, ear canal and external ear normal.      Nose: Nose normal.      Mouth/Throat:      Mouth: Mucous membranes are moist.      Pharynx: No oropharyngeal exudate or posterior oropharyngeal erythema.   Eyes:      General:         Right eye: No discharge.         Left eye: No discharge.      Conjunctiva/sclera: Conjunctivae normal.   Neck:      Thyroid: No thyromegaly.   Cardiovascular:      Rate and Rhythm: Normal rate and regular rhythm.      Heart sounds: Normal heart sounds. No murmur heard.  Pulmonary:      Effort: Pulmonary effort is normal. No respiratory distress.      Breath sounds: Normal breath sounds. No wheezing or rales.   Abdominal:      General: Bowel sounds are normal. There is no distension.      Palpations: Abdomen is soft.      Tenderness: There is no abdominal tenderness. There is no guarding.   Musculoskeletal:         General: No deformity. Normal range of motion.      Cervical back: Neck supple.   Skin:     General: Skin is warm.      Findings: No rash.   Neurological:      General: No focal deficit present.      Mental Status: She is alert. Mental status is at baseline.      Gait: Gait is intact.   Psychiatric:         Mood and Affect: Mood and affect normal.         Judgment: Judgment normal.      Medical assistant Brenda present in room during pelvic exam.  Pelvic Exam -  Normal external genitalia with no lesions. Vaginal Mucosa:  normal vaginal mucosa . Cervix with no visible lesions. No " cervical motion tenderness. Uterus is normal sized with no masses. No adnexal tenderness or enlargement appreciated. Thin Prep Pap is obtained, vaginal swab is obtained and specimen(s) sent to lab.      Assessment/Plan:    Kacey was seen today for annual exam.    Diagnoses and all orders for this visit:    Encounter for annual routine gynecological examination:  Pap smear done today, had mammogram done already, referral to GI for colonoscopy.  Hep B first dose given today, second dose in 1 to 2-month, third dose in 6 months.  Tdap vaccination given today.  Anticipatory guidance discussed above.    Screening for HIV (human immunodeficiency virus)  -     HIV AG/AB COMBO ASSAY SCREENING; Future    Need for hepatitis C screening test  -     HEP C VIRUS ANTIBODY; Future    Screening for colorectal cancer  -     Referral to GI for Colonoscopy    Need for vaccination  -     Hep B Adult 20+  -     Tdap =>8yo IM    Screening for malignant neoplasm of cervix  -     THINPREP PAP WITH HPV; Future    At risk for breast cancer  -     Referral to Genetic Research Studies        Problem List Items Addressed This Visit       Prediabetes     Chronic problem, stable based on last labs, recheck A1c ordered.         Relevant Orders    HEMOGLOBIN A1C    Essential hypertension     Chronic problem, stable, continue amlodipine 10 mg daily and losartan 50 mg daily.         Relevant Orders    Comp Metabolic Panel    Dyslipidemia     Chronic problem, uncontrolled, recheck lipid panel with next blood work         Relevant Orders    Comp Metabolic Panel    Lipid Profile            Follow up in 1 year for annual physical, 3 months for lab follow-up

## 2022-12-19 LAB
CYTOLOGY REG CYTOL: NORMAL
HPV HR 12 DNA CVX QL NAA+PROBE: NEGATIVE
HPV16 DNA SPEC QL NAA+PROBE: NEGATIVE
HPV18 DNA SPEC QL NAA+PROBE: NEGATIVE
SPECIMEN SOURCE: NORMAL

## 2023-02-24 ENCOUNTER — OFFICE VISIT (OUTPATIENT)
Dept: MEDICAL GROUP | Facility: MEDICAL CENTER | Age: 48
End: 2023-02-24
Payer: COMMERCIAL

## 2023-02-24 VITALS
DIASTOLIC BLOOD PRESSURE: 80 MMHG | WEIGHT: 187.39 LBS | SYSTOLIC BLOOD PRESSURE: 136 MMHG | RESPIRATION RATE: 16 BRPM | HEIGHT: 64 IN | OXYGEN SATURATION: 97 % | HEART RATE: 83 BPM | TEMPERATURE: 97.5 F | BODY MASS INDEX: 31.99 KG/M2

## 2023-02-24 DIAGNOSIS — I10 ESSENTIAL HYPERTENSION: ICD-10-CM

## 2023-02-24 DIAGNOSIS — E66.09 CLASS 1 OBESITY DUE TO EXCESS CALORIES WITH SERIOUS COMORBIDITY AND BODY MASS INDEX (BMI) OF 32.0 TO 32.9 IN ADULT: ICD-10-CM

## 2023-02-24 DIAGNOSIS — Z23 NEED FOR VACCINATION: ICD-10-CM

## 2023-02-24 PROCEDURE — 99214 OFFICE O/P EST MOD 30 MIN: CPT | Mod: 25 | Performed by: FAMILY MEDICINE

## 2023-02-24 PROCEDURE — 90746 HEPB VACCINE 3 DOSE ADULT IM: CPT | Performed by: FAMILY MEDICINE

## 2023-02-24 PROCEDURE — 90471 IMMUNIZATION ADMIN: CPT | Performed by: FAMILY MEDICINE

## 2023-02-24 ASSESSMENT — ENCOUNTER SYMPTOMS
COUGH: 0
CHILLS: 0
FEVER: 0
SHORTNESS OF BREATH: 0
PALPITATIONS: 0
WHEEZING: 0

## 2023-02-24 ASSESSMENT — PATIENT HEALTH QUESTIONNAIRE - PHQ9: CLINICAL INTERPRETATION OF PHQ2 SCORE: 0

## 2023-02-24 NOTE — ASSESSMENT & PLAN NOTE
Chronic problem, unstable, borderline control, recommended to increase losartan to 75 mg daily and continue amlodipine 10 mg daily.  Monitor blood pressure at home and bring log at next visit.

## 2023-02-24 NOTE — ASSESSMENT & PLAN NOTE
Chronic problem, unstable, although improving, counseled to eat healthy diet and exercise.  Follow-up in 1 month.

## 2023-03-24 ENCOUNTER — HOSPITAL ENCOUNTER (OUTPATIENT)
Dept: LAB | Facility: MEDICAL CENTER | Age: 48
End: 2023-03-24
Attending: FAMILY MEDICINE
Payer: COMMERCIAL

## 2023-03-24 DIAGNOSIS — R73.03 PREDIABETES: ICD-10-CM

## 2023-03-24 DIAGNOSIS — E78.5 DYSLIPIDEMIA: ICD-10-CM

## 2023-03-24 DIAGNOSIS — Z11.4 SCREENING FOR HIV (HUMAN IMMUNODEFICIENCY VIRUS): ICD-10-CM

## 2023-03-24 DIAGNOSIS — Z11.59 NEED FOR HEPATITIS C SCREENING TEST: ICD-10-CM

## 2023-03-24 DIAGNOSIS — I10 ESSENTIAL HYPERTENSION: ICD-10-CM

## 2023-03-24 LAB
EST. AVERAGE GLUCOSE BLD GHB EST-MCNC: 128 MG/DL
HBA1C MFR BLD: 6.1 % (ref 4–5.6)
HCV AB SER QL: NORMAL
HIV 1+2 AB+HIV1 P24 AG SERPL QL IA: NORMAL

## 2023-03-24 PROCEDURE — 80053 COMPREHEN METABOLIC PANEL: CPT

## 2023-03-24 PROCEDURE — 80061 LIPID PANEL: CPT

## 2023-03-24 PROCEDURE — 36415 COLL VENOUS BLD VENIPUNCTURE: CPT

## 2023-03-24 PROCEDURE — 83036 HEMOGLOBIN GLYCOSYLATED A1C: CPT

## 2023-03-24 PROCEDURE — 87389 HIV-1 AG W/HIV-1&-2 AB AG IA: CPT

## 2023-03-24 PROCEDURE — 86803 HEPATITIS C AB TEST: CPT

## 2023-03-25 LAB
ALBUMIN SERPL BCP-MCNC: 4.5 G/DL (ref 3.2–4.9)
ALBUMIN/GLOB SERPL: 1.4 G/DL
ALP SERPL-CCNC: 76 U/L (ref 30–99)
ALT SERPL-CCNC: 23 U/L (ref 2–50)
ANION GAP SERPL CALC-SCNC: 15 MMOL/L (ref 7–16)
AST SERPL-CCNC: 18 U/L (ref 12–45)
BILIRUB SERPL-MCNC: 0.6 MG/DL (ref 0.1–1.5)
BUN SERPL-MCNC: 12 MG/DL (ref 8–22)
CALCIUM ALBUM COR SERPL-MCNC: 9.1 MG/DL (ref 8.5–10.5)
CALCIUM SERPL-MCNC: 9.5 MG/DL (ref 8.5–10.5)
CHLORIDE SERPL-SCNC: 104 MMOL/L (ref 96–112)
CHOLEST SERPL-MCNC: 191 MG/DL (ref 100–199)
CO2 SERPL-SCNC: 23 MMOL/L (ref 20–33)
CREAT SERPL-MCNC: 0.64 MG/DL (ref 0.5–1.4)
FASTING STATUS PATIENT QL REPORTED: NORMAL
GFR SERPLBLD CREATININE-BSD FMLA CKD-EPI: 111 ML/MIN/1.73 M 2
GLOBULIN SER CALC-MCNC: 3.2 G/DL (ref 1.9–3.5)
GLUCOSE SERPL-MCNC: 102 MG/DL (ref 65–99)
HDLC SERPL-MCNC: 56 MG/DL
LDLC SERPL CALC-MCNC: 108 MG/DL
POTASSIUM SERPL-SCNC: 4.4 MMOL/L (ref 3.6–5.5)
PROT SERPL-MCNC: 7.7 G/DL (ref 6–8.2)
SODIUM SERPL-SCNC: 142 MMOL/L (ref 135–145)
TRIGL SERPL-MCNC: 133 MG/DL (ref 0–149)

## 2023-04-07 ENCOUNTER — OFFICE VISIT (OUTPATIENT)
Dept: MEDICAL GROUP | Facility: MEDICAL CENTER | Age: 48
End: 2023-04-07
Payer: COMMERCIAL

## 2023-04-07 VITALS
RESPIRATION RATE: 16 BRPM | WEIGHT: 185.19 LBS | HEIGHT: 64 IN | SYSTOLIC BLOOD PRESSURE: 126 MMHG | OXYGEN SATURATION: 95 % | BODY MASS INDEX: 31.62 KG/M2 | TEMPERATURE: 97.5 F | DIASTOLIC BLOOD PRESSURE: 60 MMHG | HEART RATE: 76 BPM

## 2023-04-07 DIAGNOSIS — Z12.11 SCREENING FOR COLORECTAL CANCER: ICD-10-CM

## 2023-04-07 DIAGNOSIS — E78.5 DYSLIPIDEMIA: ICD-10-CM

## 2023-04-07 DIAGNOSIS — Z12.12 SCREENING FOR COLORECTAL CANCER: ICD-10-CM

## 2023-04-07 DIAGNOSIS — I10 ESSENTIAL HYPERTENSION: ICD-10-CM

## 2023-04-07 DIAGNOSIS — R73.03 PREDIABETES: ICD-10-CM

## 2023-04-07 PROCEDURE — 99214 OFFICE O/P EST MOD 30 MIN: CPT | Performed by: FAMILY MEDICINE

## 2023-04-07 ASSESSMENT — ENCOUNTER SYMPTOMS
CHILLS: 0
PALPITATIONS: 0
FEVER: 0

## 2023-04-07 NOTE — ASSESSMENT & PLAN NOTE
Chronic problem, mildly elevated LDL level, significantly improved from previous numbers.  Continue to eat healthy diet and exercise.  Recheck labs in 6-month

## 2023-04-07 NOTE — PROGRESS NOTES
FAMILY MEDICINE VISIT                                                               Chief complaint::Diagnoses of Essential hypertension, Prediabetes, Dyslipidemia, and Screening for colorectal cancer were pertinent to this visit.    History of present illness: Kacey No is a 47 y.o. female who presented for lab follow-up.    Use . Patricia [id number].  Patient speaks Tagalog language.    Problem   Prediabetes    Recent A1c came back at 6.1.  Reports that she has cut back down on creamer and has been cutting back on rice intake also.  She does not exercise    Lab Results   Component Value Date/Time    HBA1C 6.1 (H) 03/24/2023 11:19 AM         Dyslipidemia    Recent cholesterol panel showed improvement in total cholesterol and LDL.  Currently not on any medications    The 10-year ASCVD risk score (Sally CHEEMA, et al., 2019) is: 1%    Lab Results   Component Value Date/Time    CHOLSTRLTOT 191 03/24/2023 1119    TRIGLYCERIDE 133 03/24/2023 1119    HDL 56 03/24/2023 1119     (H) 03/24/2023 1119        Essential Hypertension    Blood pressure today came back at 126/60.  She brought home blood pressure readings which showed improvement and blood pressure in similar ranges.  She is on amlodipine 10 mg daily and losartan 50 mg daily.  No side effects with medication.  No chest pain, palpitations, shortness of breath, lower leg swelling.            Review of systems:     Review of Systems   Constitutional:  Negative for chills, fever and malaise/fatigue.   Cardiovascular:  Negative for chest pain, palpitations and leg swelling.      Medications and Allergies:     Current Outpatient Medications   Medication Sig Dispense Refill    losartan (COZAAR) 50 MG Tab Take 1 Tablet by mouth every day. 90 Tablet 3    amLODIPine (NORVASC) 10 MG Tab Take 1 Tablet by mouth every day. 90 Tablet 3     No current facility-administered medications for this visit.          Vitals:    /60   Pulse 76   Temp 36.4 °C  "(97.5 °F)   Resp 16   Ht 1.626 m (5' 4\")   Wt 84 kg (185 lb 3 oz)   SpO2 95%  Body mass index is 31.79 kg/m².    Physical Exam:     Physical Exam  Constitutional:       Appearance: Normal appearance. She is well-developed and well-groomed.   HENT:      Head: Normocephalic and atraumatic.      Right Ear: External ear normal.      Left Ear: External ear normal.   Eyes:      General:         Right eye: No discharge.         Left eye: No discharge.      Conjunctiva/sclera: Conjunctivae normal.   Cardiovascular:      Rate and Rhythm: Normal rate.   Pulmonary:      Effort: Pulmonary effort is normal. No respiratory distress.   Musculoskeletal:      Cervical back: Neck supple.   Skin:     Findings: No rash.   Neurological:      Mental Status: She is alert.   Psychiatric:         Mood and Affect: Mood and affect normal.         Behavior: Behavior normal.        Labs:  I reviewed with patient recent labs resulted on 3/24/2023.    Assessment/Plan:         Problem List Items Addressed This Visit       Prediabetes     Chronic problem, unstable, counseled regarding dietary modification and start exercise 20 to 30 minutes 4-5 times in a week         Relevant Orders    Comp Metabolic Panel    HEMOGLOBIN A1C    Essential hypertension     Chronic problem, stable, continue amlodipine 10 mg daily and losartan 50 mg daily.         Dyslipidemia     Chronic problem, mildly elevated LDL level, significantly improved from previous numbers.  Continue to eat healthy diet and exercise.  Recheck labs in 6-month         Relevant Orders    Lipid Profile     Other Visit Diagnoses       Screening for colorectal cancer        Relevant Orders    COLOGDASH (FIT DNA)             Please note that this dictation was created using voice recognition software. I have made every reasonable attempt to correct obvious errors, but I expect that there are errors of grammar and possibly content that I did not discover before finalizing the note.    Follow up " in 6 months for lab follow-up.

## 2023-04-07 NOTE — ASSESSMENT & PLAN NOTE
Chronic problem, unstable, counseled regarding dietary modification and start exercise 20 to 30 minutes 4-5 times in a week

## 2023-05-01 DIAGNOSIS — I10 ESSENTIAL HYPERTENSION: ICD-10-CM

## 2023-05-01 RX ORDER — LOSARTAN POTASSIUM 50 MG/1
50 TABLET ORAL DAILY
Qty: 90 TABLET | Refills: 3 | Status: SHIPPED | OUTPATIENT
Start: 2023-05-01 | End: 2024-03-06 | Stop reason: SDUPTHER

## 2023-06-07 DIAGNOSIS — I10 ESSENTIAL HYPERTENSION: ICD-10-CM

## 2023-06-07 RX ORDER — AMLODIPINE BESYLATE 10 MG/1
10 TABLET ORAL DAILY
Qty: 90 TABLET | Refills: 3 | Status: SHIPPED | OUTPATIENT
Start: 2023-06-07

## 2024-03-06 DIAGNOSIS — I10 ESSENTIAL HYPERTENSION: ICD-10-CM

## 2024-03-06 RX ORDER — LOSARTAN POTASSIUM 50 MG/1
50 TABLET ORAL DAILY
Qty: 90 TABLET | Refills: 0 | Status: SHIPPED | OUTPATIENT
Start: 2024-03-06

## 2024-03-06 NOTE — TELEPHONE ENCOUNTER
Patient last seen by me in April 23.  I sent medication for 90 days.  Please call patient to schedule appointment

## 2024-09-05 ENCOUNTER — APPOINTMENT (OUTPATIENT)
Dept: MEDICAL GROUP | Facility: MEDICAL CENTER | Age: 49
End: 2024-09-05
Payer: COMMERCIAL

## 2024-09-13 ENCOUNTER — OFFICE VISIT (OUTPATIENT)
Dept: MEDICAL GROUP | Facility: MEDICAL CENTER | Age: 49
End: 2024-09-13
Payer: COMMERCIAL

## 2024-09-13 VITALS
WEIGHT: 196.21 LBS | OXYGEN SATURATION: 94 % | RESPIRATION RATE: 16 BRPM | SYSTOLIC BLOOD PRESSURE: 138 MMHG | DIASTOLIC BLOOD PRESSURE: 88 MMHG | TEMPERATURE: 97.9 F | BODY MASS INDEX: 33.68 KG/M2 | HEART RATE: 80 BPM

## 2024-09-13 DIAGNOSIS — R73.03 PREDIABETES: ICD-10-CM

## 2024-09-13 DIAGNOSIS — N92.6 IRREGULAR MENSTRUAL CYCLE: ICD-10-CM

## 2024-09-13 DIAGNOSIS — E78.5 DYSLIPIDEMIA: ICD-10-CM

## 2024-09-13 DIAGNOSIS — Z12.31 ENCOUNTER FOR SCREENING MAMMOGRAM FOR MALIGNANT NEOPLASM OF BREAST: ICD-10-CM

## 2024-09-13 DIAGNOSIS — Z23 NEED FOR VACCINATION: ICD-10-CM

## 2024-09-13 DIAGNOSIS — I10 ESSENTIAL HYPERTENSION: ICD-10-CM

## 2024-09-13 LAB
POCT INT CON NEG: NEGATIVE
POCT INT CON POS: POSITIVE
POCT URINE PREGNANCY TEST: NEGATIVE

## 2024-09-13 PROCEDURE — 90746 HEPB VACCINE 3 DOSE ADULT IM: CPT | Performed by: FAMILY MEDICINE

## 2024-09-13 PROCEDURE — 3079F DIAST BP 80-89 MM HG: CPT | Performed by: FAMILY MEDICINE

## 2024-09-13 PROCEDURE — 99214 OFFICE O/P EST MOD 30 MIN: CPT | Mod: 25 | Performed by: FAMILY MEDICINE

## 2024-09-13 PROCEDURE — 90471 IMMUNIZATION ADMIN: CPT | Performed by: FAMILY MEDICINE

## 2024-09-13 PROCEDURE — 3075F SYST BP GE 130 - 139MM HG: CPT | Performed by: FAMILY MEDICINE

## 2024-09-13 PROCEDURE — 81025 URINE PREGNANCY TEST: CPT | Performed by: FAMILY MEDICINE

## 2024-09-13 ASSESSMENT — PATIENT HEALTH QUESTIONNAIRE - PHQ9: CLINICAL INTERPRETATION OF PHQ2 SCORE: 0

## 2024-09-13 ASSESSMENT — ENCOUNTER SYMPTOMS
CHILLS: 0
FEVER: 0

## 2024-09-14 NOTE — PROGRESS NOTES
Verbal consent was acquired by the patient to use [employer] ambient listening note generation during this visit.      Kacey was seen today for menstrual problem.    Diagnoses and all orders for this visit:    Irregular menstrual cycle  -     CBC WITH DIFFERENTIAL; Future  -     Comp Metabolic Panel; Future  -     TSH+FREE T4  -     FSH; Future  -     ESTRADIOL; Future  -     POCT Pregnancy  -     US-PELVIC COMPLETE (TRANSABDOMINAL/TRANSVAGINAL) (COMBO); Future    Essential hypertension  -     Comp Metabolic Panel; Future    Dyslipidemia  -     Comp Metabolic Panel; Future  -     Lipid Profile; Future    Prediabetes  -     HEMOGLOBIN A1C; Future    Encounter for screening mammogram for malignant neoplasm of breast  -     MA-SCREENING MAMMO BILAT W/TOMOSYNTHESIS W/CAD; Future    Need for vaccination  -     Hep B Adult 20+                  Assessment & Plan  1. Irregular menstrual cycles.  New condition, unstable the irregular menstrual cycles likely indicate a transition into the perimenopausal phase. Blood tests including CBC, CMP, thyroid function test, estradiol, LH level levels have been ordered to rule out any underlying conditions. An ultrasound of the uterus will be performed to check for fibroids or other abnormalities.  Urine pregnancy test came back negative.    2.  Hypertension  Chronic condition, borderline control  Her blood pressure is slightly elevated today. She is currently taking losartan and amlodipine. Blood pressure will be monitored closely, and she is advised to continue her current medications.    3.  Dyslipidemia  Chronic condition, mildly elevated LDL level on previous blood test, recheck labs    Lab Results   Component Value Date/Time    CHOLSTRLTOT 191 03/24/2023 1119    TRIGLYCERIDE 133 03/24/2023 1119    HDL 56 03/24/2023 1119     (H) 03/24/2023 1119      4.  Prediabetes  Chronic condition, unstable, recheck labs.    Lab Results   Component Value Date/Time    HBA1C 6.1 (H)  03/24/2023 11:19 AM         Hepatitis B third dose given today.    Follow-up in 2-month for medication follow-up.      Chief complaint::Diagnoses of Irregular menstrual cycle, Essential hypertension, Dyslipidemia, Prediabetes, Encounter for screening mammogram for malignant neoplasm of breast, and Need for vaccination were pertinent to this visit.      History of Present Illness  The patient is a 49-year-old female who presents for evaluation of irregular menstrual cycles.    She has been experiencing irregular menstrual cycles since last year, with her most recent cycle occurring in 07/2024. Prior to this, she had a gap of six months without menstruation. She did not have a menstrual cycle in 08/2024 or in the current month. Before these irregularities began, her cycles were regular but light. She is occasionally sexually active and does not use any form of birth control. A recent pregnancy test she took was negative. She reports no symptoms of depression.    FAMILY HISTORY  She denies any family history of problems with the menstrual cycle.      Review of Systems   Constitutional:  Negative for chills and fever.   Genitourinary:         Irregular menstrual cycles          Medications and Allergies:     Current Outpatient Medications   Medication Sig Dispense Refill    amLODIPine (NORVASC) 10 MG Tab Take 1 tablet by mouth once daily 90 Tablet 0    losartan (COZAAR) 50 MG Tab Take 1 tablet by mouth once daily 90 Tablet 0     No current facility-administered medications for this visit.       /88   Pulse 80   Temp 36.6 °C (97.9 °F) (Temporal)   Resp 16   Wt 89 kg (196 lb 3.4 oz)   SpO2 94% , Body mass index is 33.68 kg/m².      Physical Exam  Constitutional:       Appearance: Normal appearance. She is well-developed and well-groomed.   HENT:      Head: Normocephalic and atraumatic.      Right Ear: External ear normal.      Left Ear: External ear normal.   Eyes:      General:         Right eye: No discharge.          Left eye: No discharge.      Conjunctiva/sclera: Conjunctivae normal.   Cardiovascular:      Rate and Rhythm: Normal rate.   Pulmonary:      Effort: Pulmonary effort is normal. No respiratory distress.   Musculoskeletal:      Cervical back: Neck supple.   Skin:     Findings: No rash.   Neurological:      Mental Status: She is alert.   Psychiatric:         Mood and Affect: Mood and affect normal.         Behavior: Behavior normal.            I reviewed with patient lab results resulted on        Please note that this dictation was created using voice recognition software. I have made every reasonable attempt to correct obvious errors, but I expect that there are errors of grammar and possibly content that I did not discover before finalizing the note.

## 2024-09-16 DIAGNOSIS — I10 ESSENTIAL HYPERTENSION: ICD-10-CM

## 2024-09-16 RX ORDER — AMLODIPINE BESYLATE 10 MG/1
10 TABLET ORAL DAILY
Qty: 90 TABLET | Refills: 3 | Status: SHIPPED | OUTPATIENT
Start: 2024-09-16

## 2024-09-16 RX ORDER — LOSARTAN POTASSIUM 50 MG/1
50 TABLET ORAL DAILY
Qty: 90 TABLET | Refills: 3 | Status: SHIPPED | OUTPATIENT
Start: 2024-09-16

## 2024-10-05 ENCOUNTER — OFFICE VISIT (OUTPATIENT)
Dept: URGENT CARE | Facility: PHYSICIAN GROUP | Age: 49
End: 2024-10-05
Payer: COMMERCIAL

## 2024-10-05 ENCOUNTER — HOSPITAL ENCOUNTER (OUTPATIENT)
Facility: MEDICAL CENTER | Age: 49
End: 2024-10-05
Payer: COMMERCIAL

## 2024-10-05 VITALS
BODY MASS INDEX: 34.94 KG/M2 | RESPIRATION RATE: 18 BRPM | TEMPERATURE: 97.3 F | HEART RATE: 72 BPM | OXYGEN SATURATION: 97 % | HEIGHT: 63 IN | WEIGHT: 197.2 LBS | DIASTOLIC BLOOD PRESSURE: 86 MMHG | SYSTOLIC BLOOD PRESSURE: 130 MMHG

## 2024-10-05 DIAGNOSIS — S81.802A OPEN WOUND OF LEFT LOWER LEG, INITIAL ENCOUNTER: Primary | ICD-10-CM

## 2024-10-05 PROCEDURE — 99212 OFFICE O/P EST SF 10 MIN: CPT

## 2024-10-05 PROCEDURE — 87205 SMEAR GRAM STAIN: CPT

## 2024-10-05 PROCEDURE — 3079F DIAST BP 80-89 MM HG: CPT

## 2024-10-05 PROCEDURE — 87077 CULTURE AEROBIC IDENTIFY: CPT | Mod: 91

## 2024-10-05 PROCEDURE — 3075F SYST BP GE 130 - 139MM HG: CPT

## 2024-10-05 PROCEDURE — 87070 CULTURE OTHR SPECIMN AEROBIC: CPT

## 2024-10-05 PROCEDURE — 87186 SC STD MICRODIL/AGAR DIL: CPT

## 2024-10-05 ASSESSMENT — ENCOUNTER SYMPTOMS
EYE DISCHARGE: 0
PALPITATIONS: 0
STRIDOR: 0
WHEEZING: 0
SHORTNESS OF BREATH: 0
FEVER: 0
EYE PAIN: 0
HEADACHES: 0
NAUSEA: 0
DIZZINESS: 0
EYE REDNESS: 0
SPUTUM PRODUCTION: 0
COUGH: 0
DIARRHEA: 0
VOMITING: 0
MYALGIAS: 0
SORE THROAT: 0
ABDOMINAL PAIN: 0
CHILLS: 0

## 2024-10-06 DIAGNOSIS — S81.802A OPEN WOUND OF LEFT LOWER LEG, INITIAL ENCOUNTER: ICD-10-CM

## 2024-10-06 LAB
GRAM STN SPEC: NORMAL
SIGNIFICANT IND 70042: NORMAL
SITE SITE: NORMAL
SOURCE SOURCE: NORMAL

## 2024-10-08 ENCOUNTER — TELEPHONE (OUTPATIENT)
Dept: HEALTH INFORMATION MANAGEMENT | Facility: OTHER | Age: 49
End: 2024-10-08
Payer: COMMERCIAL

## 2024-10-08 DIAGNOSIS — Z22.322 MRSA (METHICILLIN RESISTANT STAPH AUREUS) CULTURE POSITIVE: ICD-10-CM

## 2024-10-08 DIAGNOSIS — S81.802A OPEN WOUND OF LEFT LOWER LEG, INITIAL ENCOUNTER: Primary | ICD-10-CM

## 2024-10-08 RX ORDER — DOXYCYCLINE HYCLATE 100 MG
100 TABLET ORAL 2 TIMES DAILY
Qty: 14 TABLET | Refills: 0 | Status: SHIPPED | OUTPATIENT
Start: 2024-10-08 | End: 2024-10-15

## 2024-10-17 ENCOUNTER — APPOINTMENT (OUTPATIENT)
Dept: WOUND CARE | Facility: MEDICAL CENTER | Age: 49
End: 2024-10-17
Payer: COMMERCIAL

## 2024-10-31 ENCOUNTER — HOSPITAL ENCOUNTER (OUTPATIENT)
Dept: RADIOLOGY | Facility: MEDICAL CENTER | Age: 49
End: 2024-10-31
Attending: FAMILY MEDICINE
Payer: COMMERCIAL

## 2024-10-31 ENCOUNTER — OFFICE VISIT (OUTPATIENT)
Dept: WOUND CARE | Facility: MEDICAL CENTER | Age: 49
End: 2024-10-31
Payer: COMMERCIAL

## 2024-10-31 VITALS
TEMPERATURE: 97.4 F | SYSTOLIC BLOOD PRESSURE: 138 MMHG | RESPIRATION RATE: 18 BRPM | HEART RATE: 85 BPM | DIASTOLIC BLOOD PRESSURE: 88 MMHG

## 2024-10-31 DIAGNOSIS — N92.6 IRREGULAR MENSTRUAL CYCLE: ICD-10-CM

## 2024-10-31 DIAGNOSIS — N92.6 IRREGULAR MENSTRUAL BLEEDING: ICD-10-CM

## 2024-10-31 DIAGNOSIS — L30.9 DERMATITIS: ICD-10-CM

## 2024-10-31 DIAGNOSIS — L29.9 PRURITUS: ICD-10-CM

## 2024-10-31 DIAGNOSIS — D25.9 UTERINE LEIOMYOMA, UNSPECIFIED LOCATION: ICD-10-CM

## 2024-10-31 PROCEDURE — 99213 OFFICE O/P EST LOW 20 MIN: CPT

## 2024-10-31 PROCEDURE — 76830 TRANSVAGINAL US NON-OB: CPT

## 2024-10-31 RX ORDER — CLOBETASOL PROPIONATE 0.5 MG/G
OINTMENT TOPICAL
Qty: 30 G | Refills: 0 | Status: SHIPPED | OUTPATIENT
Start: 2024-10-31

## 2024-10-31 RX ORDER — HYDROXYZINE HYDROCHLORIDE 25 MG/1
25 TABLET, FILM COATED ORAL 3 TIMES DAILY PRN
Qty: 30 TABLET | Refills: 0 | Status: SHIPPED | OUTPATIENT
Start: 2024-10-31

## 2024-10-31 ASSESSMENT — ENCOUNTER SYMPTOMS
CLAUDICATION: 0
FEVER: 0
CHILLS: 0

## 2024-11-01 ENCOUNTER — APPOINTMENT (OUTPATIENT)
Dept: RADIOLOGY | Facility: MEDICAL CENTER | Age: 49
End: 2024-11-01
Attending: FAMILY MEDICINE
Payer: COMMERCIAL

## 2024-11-01 ENCOUNTER — HOSPITAL ENCOUNTER (OUTPATIENT)
Dept: LAB | Facility: MEDICAL CENTER | Age: 49
End: 2024-11-01
Attending: FAMILY MEDICINE
Payer: COMMERCIAL

## 2024-11-01 DIAGNOSIS — I10 ESSENTIAL HYPERTENSION: ICD-10-CM

## 2024-11-01 DIAGNOSIS — N92.6 IRREGULAR MENSTRUAL CYCLE: ICD-10-CM

## 2024-11-01 DIAGNOSIS — R73.03 PREDIABETES: ICD-10-CM

## 2024-11-01 DIAGNOSIS — Z12.31 ENCOUNTER FOR SCREENING MAMMOGRAM FOR MALIGNANT NEOPLASM OF BREAST: ICD-10-CM

## 2024-11-01 DIAGNOSIS — E78.5 DYSLIPIDEMIA: ICD-10-CM

## 2024-11-01 LAB
ALBUMIN SERPL BCP-MCNC: 4.2 G/DL (ref 3.2–4.9)
ALBUMIN/GLOB SERPL: 1.4 G/DL
ALP SERPL-CCNC: 87 U/L (ref 30–99)
ALT SERPL-CCNC: 24 U/L (ref 2–50)
ANION GAP SERPL CALC-SCNC: 11 MMOL/L (ref 7–16)
AST SERPL-CCNC: 21 U/L (ref 12–45)
BASOPHILS # BLD AUTO: 0.4 % (ref 0–1.8)
BASOPHILS # BLD: 0.04 K/UL (ref 0–0.12)
BILIRUB SERPL-MCNC: 0.3 MG/DL (ref 0.1–1.5)
BUN SERPL-MCNC: 11 MG/DL (ref 8–22)
CALCIUM ALBUM COR SERPL-MCNC: 9 MG/DL (ref 8.5–10.5)
CALCIUM SERPL-MCNC: 9.2 MG/DL (ref 8.5–10.5)
CHLORIDE SERPL-SCNC: 106 MMOL/L (ref 96–112)
CHOLEST SERPL-MCNC: 193 MG/DL (ref 100–199)
CO2 SERPL-SCNC: 23 MMOL/L (ref 20–33)
CREAT SERPL-MCNC: 0.62 MG/DL (ref 0.5–1.4)
EOSINOPHIL # BLD AUTO: 0.39 K/UL (ref 0–0.51)
EOSINOPHIL NFR BLD: 4 % (ref 0–6.9)
ERYTHROCYTE [DISTWIDTH] IN BLOOD BY AUTOMATED COUNT: 40.7 FL (ref 35.9–50)
EST. AVERAGE GLUCOSE BLD GHB EST-MCNC: 140 MG/DL
ESTRADIOL SERPL-MCNC: 10 PG/ML
FSH SERPL-ACNC: 9 MIU/ML
GFR SERPLBLD CREATININE-BSD FMLA CKD-EPI: 110 ML/MIN/1.73 M 2
GLOBULIN SER CALC-MCNC: 2.9 G/DL (ref 1.9–3.5)
GLUCOSE SERPL-MCNC: 104 MG/DL (ref 65–99)
HBA1C MFR BLD: 6.5 % (ref 4–5.6)
HCT VFR BLD AUTO: 47.2 % (ref 37–47)
HDLC SERPL-MCNC: 43 MG/DL
HGB BLD-MCNC: 16.1 G/DL (ref 12–16)
IMM GRANULOCYTES # BLD AUTO: 0.03 K/UL (ref 0–0.11)
IMM GRANULOCYTES NFR BLD AUTO: 0.3 % (ref 0–0.9)
LDLC SERPL CALC-MCNC: 122 MG/DL
LYMPHOCYTES # BLD AUTO: 2.31 K/UL (ref 1–4.8)
LYMPHOCYTES NFR BLD: 23.8 % (ref 22–41)
MCH RBC QN AUTO: 29.7 PG (ref 27–33)
MCHC RBC AUTO-ENTMCNC: 34.1 G/DL (ref 32.2–35.5)
MCV RBC AUTO: 86.9 FL (ref 81.4–97.8)
MONOCYTES # BLD AUTO: 0.64 K/UL (ref 0–0.85)
MONOCYTES NFR BLD AUTO: 6.6 % (ref 0–13.4)
NEUTROPHILS # BLD AUTO: 6.29 K/UL (ref 1.82–7.42)
NEUTROPHILS NFR BLD: 64.9 % (ref 44–72)
NRBC # BLD AUTO: 0.02 K/UL
NRBC BLD-RTO: 0.2 /100 WBC (ref 0–0.2)
PLATELET # BLD AUTO: 241 K/UL (ref 164–446)
PMV BLD AUTO: 10.4 FL (ref 9–12.9)
POTASSIUM SERPL-SCNC: 3.9 MMOL/L (ref 3.6–5.5)
PROT SERPL-MCNC: 7.1 G/DL (ref 6–8.2)
RBC # BLD AUTO: 5.43 M/UL (ref 4.2–5.4)
SODIUM SERPL-SCNC: 140 MMOL/L (ref 135–145)
T4 FREE SERPL-MCNC: 1.3 NG/DL (ref 0.93–1.7)
TRIGL SERPL-MCNC: 141 MG/DL (ref 0–149)
TSH SERPL-ACNC: 3.51 UIU/ML (ref 0.35–5.5)
WBC # BLD AUTO: 9.7 K/UL (ref 4.8–10.8)

## 2024-11-01 PROCEDURE — 85025 COMPLETE CBC W/AUTO DIFF WBC: CPT

## 2024-11-01 PROCEDURE — 36415 COLL VENOUS BLD VENIPUNCTURE: CPT

## 2024-11-01 PROCEDURE — 83001 ASSAY OF GONADOTROPIN (FSH): CPT

## 2024-11-01 PROCEDURE — 84439 ASSAY OF FREE THYROXINE: CPT

## 2024-11-01 PROCEDURE — 82670 ASSAY OF TOTAL ESTRADIOL: CPT

## 2024-11-01 PROCEDURE — 80053 COMPREHEN METABOLIC PANEL: CPT

## 2024-11-01 PROCEDURE — 84443 ASSAY THYROID STIM HORMONE: CPT

## 2024-11-01 PROCEDURE — 80061 LIPID PANEL: CPT

## 2024-11-01 PROCEDURE — 83036 HEMOGLOBIN GLYCOSYLATED A1C: CPT

## 2024-11-01 PROCEDURE — 77067 SCR MAMMO BI INCL CAD: CPT | Mod: 50

## 2024-11-08 ENCOUNTER — APPOINTMENT (OUTPATIENT)
Dept: WOUND CARE | Facility: MEDICAL CENTER | Age: 49
End: 2024-11-08
Payer: COMMERCIAL

## 2024-11-08 ENCOUNTER — OFFICE VISIT (OUTPATIENT)
Dept: MEDICAL GROUP | Facility: MEDICAL CENTER | Age: 49
End: 2024-11-08
Payer: COMMERCIAL

## 2024-11-08 VITALS
RESPIRATION RATE: 16 BRPM | HEIGHT: 63 IN | WEIGHT: 191.8 LBS | DIASTOLIC BLOOD PRESSURE: 80 MMHG | HEART RATE: 77 BPM | TEMPERATURE: 97.2 F | OXYGEN SATURATION: 97 % | BODY MASS INDEX: 33.98 KG/M2 | SYSTOLIC BLOOD PRESSURE: 130 MMHG

## 2024-11-08 DIAGNOSIS — E11.69 TYPE 2 DIABETES MELLITUS WITH OTHER SPECIFIED COMPLICATION, WITHOUT LONG-TERM CURRENT USE OF INSULIN (HCC): ICD-10-CM

## 2024-11-08 DIAGNOSIS — E78.5 DYSLIPIDEMIA ASSOCIATED WITH TYPE 2 DIABETES MELLITUS (HCC): ICD-10-CM

## 2024-11-08 DIAGNOSIS — D75.1 POLYCYTHEMIA: ICD-10-CM

## 2024-11-08 DIAGNOSIS — Z23 NEED FOR VACCINATION: ICD-10-CM

## 2024-11-08 DIAGNOSIS — D25.9 UTERINE LEIOMYOMA, UNSPECIFIED LOCATION: ICD-10-CM

## 2024-11-08 DIAGNOSIS — E11.69 DYSLIPIDEMIA ASSOCIATED WITH TYPE 2 DIABETES MELLITUS (HCC): ICD-10-CM

## 2024-11-08 PROCEDURE — 3079F DIAST BP 80-89 MM HG: CPT | Performed by: FAMILY MEDICINE

## 2024-11-08 PROCEDURE — 99214 OFFICE O/P EST MOD 30 MIN: CPT | Mod: 25 | Performed by: FAMILY MEDICINE

## 2024-11-08 PROCEDURE — 90471 IMMUNIZATION ADMIN: CPT | Performed by: FAMILY MEDICINE

## 2024-11-08 PROCEDURE — 3075F SYST BP GE 130 - 139MM HG: CPT | Performed by: FAMILY MEDICINE

## 2024-11-08 PROCEDURE — 90656 IIV3 VACC NO PRSV 0.5 ML IM: CPT | Performed by: FAMILY MEDICINE

## 2024-11-08 ASSESSMENT — ENCOUNTER SYMPTOMS
FEVER: 0
CHILLS: 0
PALPITATIONS: 0

## 2024-11-08 ASSESSMENT — FIBROSIS 4 INDEX: FIB4 SCORE: 0.84

## 2024-11-09 NOTE — PROGRESS NOTES
Verbal consent was acquired by the patient to use [employer] ambient listening note generation during this visit.      Kacey was seen today for lab results.    Diagnoses and all orders for this visit:    Type 2 diabetes mellitus with other specified complication, without long-term current use of insulin (HCC)  -     Comp Metabolic Panel; Future  -     HEMOGLOBIN A1C; Future  -     MICROALBUMIN CREAT RATIO URINE; Future  -     Referral to Ophthalmology  -     Diabetic Monofilament Lower Extremity Exam    Dyslipidemia associated with type 2 diabetes mellitus (HCC)  -     Lipid Profile; Future    Polycythemia  -     CBC WITH DIFFERENTIAL; Future    Need for vaccination  -     INFLUENZA VACCINE TRI INJ (PF)         Patient speaks tag a lot, used  ID: [id number] Juan Manuel      Assessment & Plan  1.Diabetes Mellitus.  New condition, unstable  Her A1c has increased from 6.1 to 6.5, indicating a diabetic range. She was advised to eliminate sugar, processed sugar, and carbohydrates from her diet and increase protein and vegetable consumption. If her condition improves with diet and exercise, medication may not be necessary. A urine test will be ordered along with the next blood test. An eye exam and foot exam will be conducted today.  Foot exam done today    2. Hyperlipidemia.  Chronic condition, unstable cholesterol levels are slightly elevated. She was advised to increase protein and vegetable consumption and reduce fried foods.  Recheck labs in 4-month    3. Uterine fibroid.  New condition, stable, A pelvic ultrasound revealed a fibroid in the uterus and a small cyst on the right ovary. The left ovary was not visible. She is likely approaching menopause, which may result in monitoring rather than treating the fibroids. A referral to a gynecologist was made for further discussion regarding the uterine fibroid.    4.  Polycythemia  New condition, unstable, likely dehydration, recommended to drink more water, recheck  "labs in 4 months.    Follow-up  Return in 4 months for a blood test follow-up.          Chief complaint::Diagnoses of Type 2 diabetes mellitus with other specified complication, without long-term current use of insulin (HCC), Dyslipidemia associated with type 2 diabetes mellitus (HCC), Polycythemia, and Need for vaccination were pertinent to this visit.      History of Present Illness  The patient is a 49-year-old female who presents for blood test follow-up. She is accompanied by an adult male.    She reports consuming approximately four small bottles of water daily. She expresses a desire to avoid starting medication for her diabetes, instead opting to reduce her coffee intake and gum chewing. She also mentions a dislike for protein-rich foods.    She experienced heavy menstrual bleeding about four years ago. However, in recent months, her menstrual cycle has become irregular, occurring every three to four months, and the volume of bleeding has decreased.      Review of Systems   Constitutional:  Negative for chills and fever.   Cardiovascular:  Negative for chest pain, palpitations and leg swelling.          Medications and Allergies:     Current Outpatient Medications   Medication Sig Dispense Refill    clobetasol (TEMOVATE) 0.05 % Ointment Apply thin layer of steroid ointment to rash twice daily after showering for the next 2 weeks 30 g 0    hydrOXYzine HCl (ATARAX) 25 MG Tab Take 1 Tablet by mouth 3 times a day as needed for Itching. 30 Tablet 0    amLODIPine (NORVASC) 10 MG Tab Take 1 Tablet by mouth every day. 90 Tablet 3    losartan (COZAAR) 50 MG Tab Take 1 Tablet by mouth every day. 90 Tablet 3     No current facility-administered medications for this visit.       /80 (BP Location: Right arm, Patient Position: Sitting, BP Cuff Size: Adult)   Pulse 77   Temp 36.2 °C (97.2 °F) (Temporal)   Resp 16   Ht 1.6 m (5' 3\")   Wt 87 kg (191 lb 12.8 oz)   SpO2 97% , Body mass index is 33.98 " kg/m².      Physical Exam  Constitutional:       Appearance: Normal appearance. She is well-developed and well-groomed.   HENT:      Head: Normocephalic and atraumatic.      Right Ear: External ear normal.      Left Ear: External ear normal.   Eyes:      General:         Right eye: No discharge.         Left eye: No discharge.      Conjunctiva/sclera: Conjunctivae normal.   Cardiovascular:      Rate and Rhythm: Normal rate.   Pulmonary:      Effort: Pulmonary effort is normal. No respiratory distress.   Musculoskeletal:      Cervical back: Neck supple.   Skin:     Findings: No rash.   Neurological:      Mental Status: She is alert.   Psychiatric:         Mood and Affect: Mood and affect normal.         Behavior: Behavior normal.        Monofilament testing with a 10 gram force: sensation intact: intact bilaterally  Visual Inspection: Feet without maceration, ulcers, fissures.  Pedal pulses: intact bilaterally     I reviewed with patient lab results resulted on 11/1/2024        Please note that this dictation was created using voice recognition software. I have made every reasonable attempt to correct obvious errors, but I expect that there are errors of grammar and possibly content that I did not discover before finalizing the note.

## 2024-11-14 ENCOUNTER — OFFICE VISIT (OUTPATIENT)
Dept: WOUND CARE | Facility: MEDICAL CENTER | Age: 49
End: 2024-11-14
Payer: COMMERCIAL

## 2024-11-14 DIAGNOSIS — Z91.199 FAILURE TO ATTEND APPOINTMENT: ICD-10-CM

## 2024-11-14 PROCEDURE — 99999 PR NO CHARGE: CPT | Performed by: NURSE PRACTITIONER

## 2024-11-15 ENCOUNTER — NON-PROVIDER VISIT (OUTPATIENT)
Dept: WOUND CARE | Facility: MEDICAL CENTER | Age: 49
End: 2024-11-15
Payer: COMMERCIAL

## 2024-11-15 PROCEDURE — 99211 OFF/OP EST MAY X REQ PHY/QHP: CPT

## 2024-11-15 NOTE — PATIENT INSTRUCTIONS
- Resolved wound be fragile for a few days, bathe and dry area gently, only ever regains a maximum of 80% of the tensile strength of the surrounding skin, remodeling of scar can continue for 6mo - a year. Contact PCP for a referral back her if any problems with area opening and draining again.

## 2024-11-15 NOTE — PROGRESS NOTES
[facility]   [facility]   [address]   [address]   [place] [address]   [phone] Fax: [phone]    Discharge Note      Referring Physician: Thang Ortega A.P.R.N.  Wound Etiology: Open wound of left lower leg, initial encounter   Wound location: Left posterior popliteal  ICD-10: S81.802A   Date of Discharge: 11/15/24    Assessment:  Discharge patient at this time secondary to wound resolution.        Thank you for the referral and the opportunity to treat your patient.

## 2024-11-21 ENCOUNTER — APPOINTMENT (OUTPATIENT)
Dept: WOUND CARE | Facility: MEDICAL CENTER | Age: 49
End: 2024-11-21
Payer: COMMERCIAL

## 2024-11-29 ENCOUNTER — APPOINTMENT (OUTPATIENT)
Dept: WOUND CARE | Facility: MEDICAL CENTER | Age: 49
End: 2024-11-29
Payer: COMMERCIAL

## 2024-12-08 DIAGNOSIS — I10 ESSENTIAL HYPERTENSION: ICD-10-CM

## 2024-12-09 RX ORDER — LOSARTAN POTASSIUM 50 MG/1
50 TABLET ORAL DAILY
Qty: 90 TABLET | Refills: 3 | Status: SHIPPED | OUTPATIENT
Start: 2024-12-09

## 2024-12-09 RX ORDER — AMLODIPINE BESYLATE 10 MG/1
10 TABLET ORAL DAILY
Qty: 90 TABLET | Refills: 3 | Status: SHIPPED | OUTPATIENT
Start: 2024-12-09

## 2025-01-23 ENCOUNTER — GYNECOLOGY VISIT (OUTPATIENT)
Dept: OBGYN | Facility: CLINIC | Age: 50
End: 2025-01-23
Attending: FAMILY MEDICINE
Payer: COMMERCIAL

## 2025-01-23 VITALS
DIASTOLIC BLOOD PRESSURE: 98 MMHG | SYSTOLIC BLOOD PRESSURE: 138 MMHG | WEIGHT: 190.8 LBS | HEIGHT: 63 IN | BODY MASS INDEX: 33.81 KG/M2

## 2025-01-23 DIAGNOSIS — N92.6 IRREGULAR MENSTRUAL CYCLE: ICD-10-CM

## 2025-01-23 PROCEDURE — 3080F DIAST BP >= 90 MM HG: CPT | Performed by: FAMILY MEDICINE

## 2025-01-23 PROCEDURE — 3075F SYST BP GE 130 - 139MM HG: CPT | Performed by: FAMILY MEDICINE

## 2025-01-23 PROCEDURE — 99202 OFFICE O/P NEW SF 15 MIN: CPT | Performed by: FAMILY MEDICINE

## 2025-01-23 ASSESSMENT — FIBROSIS 4 INDEX: FIB4 SCORE: 0.84

## 2025-01-23 NOTE — PROGRESS NOTES
Patient here for GYN visit - NP, referral for Uterine leiomyoma, and irreg menstrual bleeding  LMP: Irreg, June or July 2024  BCM : None  Pap : 12/16/22 NILM (-)   Mammo : 11/1/24  Phone/Pharmacy verified: [phone]    US Pelvic 10/31/24 - cyst left ovary, and uterine fibroids  Pt states she sometimes gets hot flashes, explained some signs of menopausal symptoms but patient is unaware what it is.

## 2025-01-23 NOTE — PROGRESS NOTES
[facility]  Clinic Note    ID: Kacey No is 49 y.o. here for evaluation of irregular menses..    Subjective     CC:    Chief Complaint   Patient presents with    Gynecologic Exam     NP, referral for Uterine Leiomyoma and irreg menstruation        HPI:   No concerns, not sure why she is referred to us.  Will get repeat ultrasound, RTC in 2-3 months.  She did have a pelvic ultrasound in October 2024 that showed several small fibroids. Patient is not experiencing any pain. She is not interested in surgery, including myomectomy or hysterectomy at this time.     Her menses have become irregular, which may be due to lucrecia-menopause. Menses are not heavy and not causing anemia. Patient is sexually active with her partner.    Patient is up to date on her preventative care.  Patients prior labs and imaging were reviewed.    [employer]  utilized for the entire visit.    ROS:  Gen: denies fevers/chills, denies changes in weight  Pulm: denies shortness of breath, denies cough  CV: denies chest pain, denies palpitations  GI: denies nausea/vomiting, denies diarrhea or constipation  : denies dysuria, denies vaginal discharge or odor  Skin: denies rash    Patient Active Problem List   Diagnosis    Hidradenitis    Essential hypertension    Class 1 obesity due to excess calories with serious comorbidity and body mass index (BMI) of 32.0 to 32.9 in adult    Dyslipidemia associated with type 2 diabetes mellitus (HCC)    Type 2 diabetes mellitus with other specified complication (HCC)    Urinary frequency    Irregular menstrual cycle    Uterine leiomyoma        Current Outpatient Medications   Medication Instructions    amLODIPine (NORVASC) 10 mg, Oral, DAILY    clobetasol (TEMOVATE) 0.05 % Ointment Apply thin layer of steroid ointment to rash twice daily after showering for the next 2 weeks    hydrOXYzine HCl (ATARAX) 25 mg, Oral, 3 TIMES DAILY PRN    losartan (COZAAR) 50 mg, Oral, DAILY        Objective:  "    BP (!) 138/98 (BP Location: Right arm, Patient Position: Sitting, BP Cuff Size: Large adult)   Ht 5' 3\"   Wt 190 lb 12.8 oz   LMP  (Approximate) Comment: June or July  BMI 33.80 kg/m²     Gen: Alert and oriented, No apparent distress.  Lungs: Breathing comfortably on room air, no cough  CV: Extremities are warm and well perfused, no BLE edema  MSK: Normal movement of extremities, gait normal      Assessment & Plan:     Kacey No is 49 y.o. here for evaluation of irregular menses.    1. Irregular menstrual cycle  - US-PELVIC COMPLETE (TRANSABDOMINAL/TRANSVAGINAL) (COMBO); Future  - maintain a diary of any symptoms that are concerning to you.      Return to clinic in 2-3 months for follow up ultrasound.    Ashley Ortiz MD, MPH  "

## 2025-02-27 ENCOUNTER — HOSPITAL ENCOUNTER (OUTPATIENT)
Dept: LAB | Facility: MEDICAL CENTER | Age: 50
End: 2025-02-27
Attending: FAMILY MEDICINE
Payer: COMMERCIAL

## 2025-02-27 DIAGNOSIS — E11.69 TYPE 2 DIABETES MELLITUS WITH OTHER SPECIFIED COMPLICATION, WITHOUT LONG-TERM CURRENT USE OF INSULIN (HCC): ICD-10-CM

## 2025-02-27 DIAGNOSIS — E11.69 DYSLIPIDEMIA ASSOCIATED WITH TYPE 2 DIABETES MELLITUS (HCC): ICD-10-CM

## 2025-02-27 DIAGNOSIS — E78.5 DYSLIPIDEMIA ASSOCIATED WITH TYPE 2 DIABETES MELLITUS (HCC): ICD-10-CM

## 2025-02-27 DIAGNOSIS — D75.1 POLYCYTHEMIA: ICD-10-CM

## 2025-02-27 LAB
CREAT UR-MCNC: 89.5 MG/DL
MICROALBUMIN UR-MCNC: <1.2 MG/DL
MICROALBUMIN/CREAT UR: NORMAL MG/G (ref 0–30)

## 2025-02-27 PROCEDURE — 85025 COMPLETE CBC W/AUTO DIFF WBC: CPT

## 2025-02-27 PROCEDURE — 82043 UR ALBUMIN QUANTITATIVE: CPT

## 2025-02-27 PROCEDURE — 83036 HEMOGLOBIN GLYCOSYLATED A1C: CPT

## 2025-02-27 PROCEDURE — 36415 COLL VENOUS BLD VENIPUNCTURE: CPT

## 2025-02-27 PROCEDURE — 82570 ASSAY OF URINE CREATININE: CPT

## 2025-02-27 PROCEDURE — 80061 LIPID PANEL: CPT

## 2025-02-27 PROCEDURE — 80053 COMPREHEN METABOLIC PANEL: CPT

## 2025-02-28 ENCOUNTER — RESULTS FOLLOW-UP (OUTPATIENT)
Dept: MEDICAL GROUP | Facility: MEDICAL CENTER | Age: 50
End: 2025-02-28

## 2025-02-28 LAB
ALBUMIN SERPL BCP-MCNC: 4.5 G/DL (ref 3.2–4.9)
ALBUMIN/GLOB SERPL: 1.6 G/DL
ALP SERPL-CCNC: 82 U/L (ref 30–99)
ALT SERPL-CCNC: 29 U/L (ref 2–50)
ANION GAP SERPL CALC-SCNC: 24 MMOL/L (ref 7–16)
AST SERPL-CCNC: 26 U/L (ref 12–45)
BASOPHILS # BLD AUTO: 0.4 % (ref 0–1.8)
BASOPHILS # BLD: 0.03 K/UL (ref 0–0.12)
BILIRUB SERPL-MCNC: 0.6 MG/DL (ref 0.1–1.5)
BUN SERPL-MCNC: 13 MG/DL (ref 8–22)
CALCIUM ALBUM COR SERPL-MCNC: 9 MG/DL (ref 8.5–10.5)
CALCIUM SERPL-MCNC: 9.4 MG/DL (ref 8.5–10.5)
CHLORIDE SERPL-SCNC: 105 MMOL/L (ref 96–112)
CHOLEST SERPL-MCNC: 187 MG/DL (ref 100–199)
CO2 SERPL-SCNC: 15 MMOL/L (ref 20–33)
CREAT SERPL-MCNC: 0.8 MG/DL (ref 0.5–1.4)
EOSINOPHIL # BLD AUTO: 0.24 K/UL (ref 0–0.51)
EOSINOPHIL NFR BLD: 2.9 % (ref 0–6.9)
ERYTHROCYTE [DISTWIDTH] IN BLOOD BY AUTOMATED COUNT: 40.9 FL (ref 35.9–50)
EST. AVERAGE GLUCOSE BLD GHB EST-MCNC: 140 MG/DL
FASTING STATUS PATIENT QL REPORTED: NORMAL
GFR SERPLBLD CREATININE-BSD FMLA CKD-EPI: 91 ML/MIN/1.73 M 2
GLOBULIN SER CALC-MCNC: 2.8 G/DL (ref 1.9–3.5)
GLUCOSE SERPL-MCNC: 86 MG/DL (ref 65–99)
HBA1C MFR BLD: 6.5 % (ref 4–5.6)
HCT VFR BLD AUTO: 44.1 % (ref 37–47)
HDLC SERPL-MCNC: 51 MG/DL
HGB BLD-MCNC: 15.1 G/DL (ref 12–16)
IMM GRANULOCYTES # BLD AUTO: 0.03 K/UL (ref 0–0.11)
IMM GRANULOCYTES NFR BLD AUTO: 0.4 % (ref 0–0.9)
LDLC SERPL CALC-MCNC: 119 MG/DL
LYMPHOCYTES # BLD AUTO: 2.48 K/UL (ref 1–4.8)
LYMPHOCYTES NFR BLD: 29.9 % (ref 22–41)
MCH RBC QN AUTO: 29 PG (ref 27–33)
MCHC RBC AUTO-ENTMCNC: 34.2 G/DL (ref 32.2–35.5)
MCV RBC AUTO: 84.6 FL (ref 81.4–97.8)
MONOCYTES # BLD AUTO: 0.35 K/UL (ref 0–0.85)
MONOCYTES NFR BLD AUTO: 4.2 % (ref 0–13.4)
NEUTROPHILS # BLD AUTO: 5.17 K/UL (ref 1.82–7.42)
NEUTROPHILS NFR BLD: 62.2 % (ref 44–72)
NRBC # BLD AUTO: 0 K/UL
NRBC BLD-RTO: 0 /100 WBC (ref 0–0.2)
PLATELET # BLD AUTO: 226 K/UL (ref 164–446)
PMV BLD AUTO: 13.2 FL (ref 9–12.9)
POTASSIUM SERPL-SCNC: 3.9 MMOL/L (ref 3.6–5.5)
PROT SERPL-MCNC: 7.3 G/DL (ref 6–8.2)
RBC # BLD AUTO: 5.21 M/UL (ref 4.2–5.4)
SODIUM SERPL-SCNC: 144 MMOL/L (ref 135–145)
TRIGL SERPL-MCNC: 86 MG/DL (ref 0–149)
WBC # BLD AUTO: 8.3 K/UL (ref 4.8–10.8)

## 2025-03-07 ENCOUNTER — OFFICE VISIT (OUTPATIENT)
Dept: MEDICAL GROUP | Facility: MEDICAL CENTER | Age: 50
End: 2025-03-07
Payer: COMMERCIAL

## 2025-03-07 VITALS
SYSTOLIC BLOOD PRESSURE: 128 MMHG | HEIGHT: 64 IN | WEIGHT: 194 LBS | OXYGEN SATURATION: 96 % | HEART RATE: 97 BPM | DIASTOLIC BLOOD PRESSURE: 80 MMHG | BODY MASS INDEX: 33.12 KG/M2 | TEMPERATURE: 97.3 F

## 2025-03-07 DIAGNOSIS — E11.69 TYPE 2 DIABETES MELLITUS WITH OTHER SPECIFIED COMPLICATION, WITHOUT LONG-TERM CURRENT USE OF INSULIN (HCC): ICD-10-CM

## 2025-03-07 DIAGNOSIS — E11.69 DYSLIPIDEMIA ASSOCIATED WITH TYPE 2 DIABETES MELLITUS (HCC): ICD-10-CM

## 2025-03-07 DIAGNOSIS — E66.811 CLASS 1 OBESITY DUE TO EXCESS CALORIES WITH SERIOUS COMORBIDITY AND BODY MASS INDEX (BMI) OF 33.0 TO 33.9 IN ADULT: ICD-10-CM

## 2025-03-07 DIAGNOSIS — Z23 NEED FOR VACCINATION: ICD-10-CM

## 2025-03-07 DIAGNOSIS — E78.5 DYSLIPIDEMIA ASSOCIATED WITH TYPE 2 DIABETES MELLITUS (HCC): ICD-10-CM

## 2025-03-07 DIAGNOSIS — E66.09 CLASS 1 OBESITY DUE TO EXCESS CALORIES WITH SERIOUS COMORBIDITY AND BODY MASS INDEX (BMI) OF 33.0 TO 33.9 IN ADULT: ICD-10-CM

## 2025-03-07 PROCEDURE — 99214 OFFICE O/P EST MOD 30 MIN: CPT | Mod: 25 | Performed by: FAMILY MEDICINE

## 2025-03-07 PROCEDURE — 90471 IMMUNIZATION ADMIN: CPT | Performed by: FAMILY MEDICINE

## 2025-03-07 PROCEDURE — 3074F SYST BP LT 130 MM HG: CPT | Performed by: FAMILY MEDICINE

## 2025-03-07 PROCEDURE — 3079F DIAST BP 80-89 MM HG: CPT | Performed by: FAMILY MEDICINE

## 2025-03-07 PROCEDURE — 90677 PCV20 VACCINE IM: CPT | Performed by: FAMILY MEDICINE

## 2025-03-07 RX ORDER — ROSUVASTATIN CALCIUM 10 MG/1
10 TABLET, COATED ORAL EVERY EVENING
Qty: 90 TABLET | Refills: 3 | Status: SHIPPED | OUTPATIENT
Start: 2025-03-07

## 2025-03-07 ASSESSMENT — ENCOUNTER SYMPTOMS
PALPITATIONS: 0
CHILLS: 0
FEVER: 0

## 2025-03-07 ASSESSMENT — PATIENT HEALTH QUESTIONNAIRE - PHQ9: CLINICAL INTERPRETATION OF PHQ2 SCORE: 0

## 2025-03-07 ASSESSMENT — FIBROSIS 4 INDEX: FIB4 SCORE: 1

## 2025-03-07 NOTE — ASSESSMENT & PLAN NOTE
Chronic condition, unstable, counseled in detail about diet modification and exercise for weight loss.

## 2025-03-07 NOTE — PROGRESS NOTES
FAMILY MEDICINE VISIT                                                               Assessment/Plan:         Problem List Items Addressed This Visit       Class 1 obesity due to excess calories with serious comorbidity and body mass index (BMI) of 33.0 to 33.9 in adult    Chronic condition, unstable, counseled in detail about diet modification and exercise for weight loss.         Relevant Medications    metFORMIN (GLUCOPHAGE) 500 MG Tab    Other Relevant Orders    Patient identified as having weight management issue.  Appropriate orders and counseling given.    Dyslipidemia associated with type 2 diabetes mellitus (HCC)    Chronic condition, unstable, start Crestor 10 mg daily         Relevant Medications    rosuvastatin (CRESTOR) 10 MG Tab    metFORMIN (GLUCOPHAGE) 500 MG Tab    Other Relevant Orders    Lipid Profile    Type 2 diabetes mellitus with other specified complication (HCC)    Chronic condition, unstable, counseled to cut back on rice intake and eat brown rice with vegetables and lean meat.  Start metformin 500 mg once daily for 1 week then increase it to 2 times daily.  Eye exam done today.  Recheck labs in 3 to 4 months         Relevant Medications    metFORMIN (GLUCOPHAGE) 500 MG Tab    Other Relevant Orders    POCT Retinal Eye Exam    Comp Metabolic Panel    HEMOGLOBIN A1C    VITAMIN B12     Other Visit Diagnoses         Need for vaccination        Relevant Orders    Pneumococcal Conjugate Vaccine 20-Valent (6 wks+)  PCV 20 given today               Follow up in  3- 4 months for lab follow-up.      Chief complaint::Diagnoses of Type 2 diabetes mellitus with other specified complication, without long-term current use of insulin (HCC), Dyslipidemia associated with type 2 diabetes mellitus (HCC), Class 1 obesity due to excess calories with serious comorbidity and body mass index (BMI) of 33.0 to 33.9 in adult, and Need for vaccination were pertinent to this visit.    History of present illness: Kacey  "Harpreet No is a 49 y.o. female who presented for lab follow up.    Patient speaks Tagalog, used  Line: [id number] azalia Still   Type 2 Diabetes Mellitus With Other Specified Complication (Hcc)        Lab Results   Component Value Date/Time    HBA1C 6.5 (H) 02/27/2025 08:02 AM         Dyslipidemia Associated With Type 2 Diabetes Mellitus (Hcc)      Lab Results   Component Value Date/Time    CHOLSTRLTOT 187 02/27/2025 0802    TRIGLYCERIDE 86 02/27/2025 0802    HDL 51 02/27/2025 0802     (H) 02/27/2025 0802            Class 1 Obesity Due to Excess Calories With Serious Comorbidity and Body Mass Index (Bmi) of 33.0 to 33.9 in Adult    Current BMI at 33.72.  Does not exercise as she reports that her work is 12 hours.            Review of systems:     Review of Systems   Constitutional:  Negative for chills and fever.   Cardiovascular:  Negative for chest pain, palpitations and leg swelling.        Medications and Allergies:     Current Outpatient Medications   Medication Sig Dispense Refill    rosuvastatin (CRESTOR) 10 MG Tab Take 1 Tablet by mouth every evening. 90 Tablet 3    metFORMIN (GLUCOPHAGE) 500 MG Tab Take 1 Tablet by mouth 2 times a day with meals. 180 Tablet 3    amLODIPine (NORVASC) 10 MG Tab Take 1 Tablet by mouth every day. 90 Tablet 3    losartan (COZAAR) 50 MG Tab Take 1 Tablet by mouth every day. 90 Tablet 3     No current facility-administered medications for this visit.          Vitals:    /80   Pulse 97   Temp 36.3 °C (97.3 °F) (Temporal)   Ht 1.615 m (5' 3.6\")   Wt 88 kg (194 lb 0.1 oz)   SpO2 96%  Body mass index is 33.72 kg/m².    Physical Exam:     Physical Exam  Constitutional:       Appearance: Normal appearance. She is well-developed and well-groomed.   HENT:      Head: Normocephalic and atraumatic.      Right Ear: External ear normal.      Left Ear: External ear normal.   Eyes:      General:         Right eye: No discharge.         Left eye: No discharge.      " Conjunctiva/sclera: Conjunctivae normal.   Cardiovascular:      Rate and Rhythm: Normal rate.   Pulmonary:      Effort: Pulmonary effort is normal. No respiratory distress.   Musculoskeletal:      Cervical back: Neck supple.   Skin:     Findings: No rash.   Neurological:      Mental Status: She is alert.   Psychiatric:         Mood and Affect: Mood and affect normal.         Behavior: Behavior normal.          Labs:  I reviewed with patient recent labs resulted on 2/27/2025.         Please note that this dictation was created using voice recognition software. I have made every reasonable attempt to correct obvious errors, but I expect that there are errors of grammar and possibly content that I did not discover before finalizing the note.

## 2025-03-07 NOTE — ASSESSMENT & PLAN NOTE
Chronic condition, unstable, counseled to cut back on rice intake and eat brown rice with vegetables and lean meat.  Start metformin 500 mg once daily for 1 week then increase it to 2 times daily.  Eye exam done today.  Recheck labs in 3 to 4 months

## 2025-03-08 ENCOUNTER — OFFICE VISIT (OUTPATIENT)
Dept: URGENT CARE | Facility: PHYSICIAN GROUP | Age: 50
End: 2025-03-08
Payer: COMMERCIAL

## 2025-03-08 VITALS
TEMPERATURE: 100 F | OXYGEN SATURATION: 96 % | HEIGHT: 63 IN | DIASTOLIC BLOOD PRESSURE: 76 MMHG | SYSTOLIC BLOOD PRESSURE: 124 MMHG | WEIGHT: 194.8 LBS | HEART RATE: 100 BPM | RESPIRATION RATE: 16 BRPM | BODY MASS INDEX: 34.52 KG/M2

## 2025-03-08 DIAGNOSIS — K21.9 GASTROESOPHAGEAL REFLUX DISEASE WITHOUT ESOPHAGITIS: ICD-10-CM

## 2025-03-08 DIAGNOSIS — R10.13 EPIGASTRIC PAIN: ICD-10-CM

## 2025-03-08 PROCEDURE — 99214 OFFICE O/P EST MOD 30 MIN: CPT | Performed by: NURSE PRACTITIONER

## 2025-03-08 PROCEDURE — 3078F DIAST BP <80 MM HG: CPT | Performed by: NURSE PRACTITIONER

## 2025-03-08 PROCEDURE — 3074F SYST BP LT 130 MM HG: CPT | Performed by: NURSE PRACTITIONER

## 2025-03-08 RX ORDER — FAMOTIDINE 20 MG/1
20 TABLET, FILM COATED ORAL 2 TIMES DAILY
Qty: 60 TABLET | Refills: 0 | Status: SHIPPED | OUTPATIENT
Start: 2025-03-08

## 2025-03-08 RX ORDER — SUCRALFATE 1 G/1
1 TABLET ORAL
Qty: 120 TABLET | Refills: 1 | Status: SHIPPED | OUTPATIENT
Start: 2025-03-08

## 2025-03-08 ASSESSMENT — ENCOUNTER SYMPTOMS: ABDOMINAL PAIN: 1

## 2025-03-08 ASSESSMENT — FIBROSIS 4 INDEX: FIB4 SCORE: 1

## 2025-03-08 NOTE — PROGRESS NOTES
Subjective     Kacey No is a 49 y.o. female who presents with Abdominal Pain (X1week. Pt thinks it may be an ulcer)       Interpretation used, iPad    Abdominal Pain    Kacey has come into urgent care for epigastric pain x 1 week. History of stomach ulcer as child.  Pain comes and goes. No food or fluids in particular causes more pain. Takes no meds for this. No primary care provider or specialist seen for this in past. Seen by primary care provider yesterday but states forgot to not mention. Pain level 4-5/10. No nausea/vomiting, diarrhea or constipation. No dysuria. No belching. No abdominal distention.  History of controlled diabetes.  Recent blood work done with no abnormalities last month. Requesting work note.      PMH:  has a past medical history of Hypertension, Pneumonia, and Type 2 diabetes mellitus with other specified complication (HCC) (11/3/2022).    She has no past medical history of Anginal syndrome (HCC), Arrhythmia, Arthritis, Asthma, At risk for sleep apnea, Back pain, Blood clotting disorder (HCC), Bronchitis, Cancer (HCC), Cataract, Congestive heart failure (HCC), COPD (chronic obstructive pulmonary disease) (HCC), Dialysis patient (HCC), Disorder of thyroid, Fall, Glaucoma, Gynecological disorder, Heart murmur, Heart valve disease, Hemorrhagic disorder (HCC), Indigestion, Jaundice, Myocardial infarct (HCC), Pacemaker, Psychiatric problem, Renal disorder, Rheumatic fever, Seizure (HCC), Stroke (HCC), or Urinary incontinence.  MEDS:   Current Outpatient Medications:     rosuvastatin (CRESTOR) 10 MG Tab, Take 1 Tablet by mouth every evening., Disp: 90 Tablet, Rfl: 3    metFORMIN (GLUCOPHAGE) 500 MG Tab, Take 1 Tablet by mouth 2 times a day with meals., Disp: 180 Tablet, Rfl: 3    amLODIPine (NORVASC) 10 MG Tab, Take 1 Tablet by mouth every day., Disp: 90 Tablet, Rfl: 3    losartan (COZAAR) 50 MG Tab, Take 1 Tablet by mouth every day., Disp: 90 Tablet, Rfl: 3  ALLERGIES:   Allergies  "  Allergen Reactions    Cindy [Drospirenone-Ethinyl Estradiol]      Asthma, cough     SURGHX:   Past Surgical History:   Procedure Laterality Date    MASS EXCISION GENERAL Right 5/15/2020    Procedure: EXCISION, MASS - AXILLA;  Surgeon: Rei Wilkerson M.D.;  Location: SURGERY [facility];  Service: General    OTHER  2014    rt cyst armpit     SOCHX:  reports that she has never smoked. She has never used smokeless tobacco. She reports that she does not drink alcohol and does not use drugs.  FH: Family history was reviewed, no pertinent findings to report      Review of Systems   Gastrointestinal:  Positive for abdominal pain.              Objective     /76   Pulse 100   Temp 37.8 °C (100 °F)   Resp 16   Ht 1.6 m (5' 3\")   Wt 88.4 kg (194 lb 12.8 oz)   SpO2 96%   BMI 34.51 kg/m²      Physical Exam  Vitals reviewed.   Constitutional:       General: She is awake. She is not in acute distress.  Cardiovascular:      Rate and Rhythm: Normal rate.   Pulmonary:      Effort: Pulmonary effort is normal.   Abdominal:      General: There is no distension.      Palpations: Abdomen is soft.      Tenderness: There is abdominal tenderness. There is no right CVA tenderness, left CVA tenderness, guarding or rebound.   Neurological:      Mental Status: She is alert.   Psychiatric:         Behavior: Behavior is cooperative.                                  Assessment & Plan  Gastroesophageal reflux disease without esophagitis    Orders:    Hyoscyamine Sulfate (GI COCKTAIL, HYOSCYAMINE-LIDOCAINE-MAALOX,); Take 40 mL by mouth one time for 1 dose.    sucralfate (CARAFATE) 1 GM Tab; Take 1 Tablet by mouth 4 Times a Day,Before Meals and at Bedtime.    famotidine (PEPCID) 20 MG Tab; Take 1 Tablet by mouth 2 times a day. May use up to 7 days then as needed.    Referral to GI for Colonoscopy    Epigastric pain    Orders:    Hyoscyamine Sulfate (GI COCKTAIL, HYOSCYAMINE-LIDOCAINE-MAALOX,); Take 40 mL by mouth one time for 1 " dose.    sucralfate (CARAFATE) 1 GM Tab; Take 1 Tablet by mouth 4 Times a Day,Before Meals and at Bedtime.    famotidine (PEPCID) 20 MG Tab; Take 1 Tablet by mouth 2 times a day. May use up to 7 days then as needed.    Referral to GI for Colonoscopy  -GI cocktail given in clinic  -Maintain water status slowly with sips of water and electrolyte fluid, increase to larger amounts as tolerated  -Introduce solid foods when vomiting ceases. Eat items from the BRAT diet- trying small amount with one item at a time  -Get rest  -May use over the counter Ibuprofen/Tylenol as needed for fever if tolerated  -Monitor for fever, increased abdominal pain, nausea/vomiting, lethargy, unable to retain fluids/meds- need re-evaluation  Follow-up with primary care provider  Follow-up with gastroenterology for further evaluation for possible ulcer     -Education provided: see above    -Return to urgent care as needed if new or worsening symptoms  -Patient expresses understanding to treatment and plan of care  -Questions were encouraged and answered  -Recommended over the counter meds were written down when requested   -Advised to follow-up with the primary care provider for recheck, reevaluation, and consideration of further management as needed     -Time spent evaluating this patient was at least 30 minutes. This time comprises of the following: preparing for visit/chart review, patient history taken into account pertinent to symptoms, patient counseling/education for needed treatment outpatient, exam/evaluation/treatment plan provided, ordering any appropriate lab/test/procedures/meds, independent interpretation of any clinic testing, medication management with any other listed medications and chart documentation.

## 2025-03-08 NOTE — LETTER
March 8, 2025       Patient: Kacey No   YOB: 1975   Date of Visit: 3/8/2025         To Whom It May Concern:    In my medical opinion, I recommend that Kacey No be excused from work tomorrow (3/9/2025) as she was evaluated in clinic.     If you have any questions or concerns, please don't hesitate to call [phone]          Sincerely,          KAYLAH Willis.  Electronically Signed

## 2025-03-10 ENCOUNTER — RESULTS FOLLOW-UP (OUTPATIENT)
Dept: MEDICAL GROUP | Facility: MEDICAL CENTER | Age: 50
End: 2025-03-10
Payer: COMMERCIAL

## 2025-03-14 NOTE — Clinical Note
REFERRAL APPROVAL NOTICE         Sent on March 13, 2025                   Kacey No  [address]  [address]  [place] [address]                   Dear Ms. No,    After a careful review of the medical information and benefit coverage, [facility] has processed your referral. See below for additional details.    If applicable, you must be actively enrolled with your insurance for coverage of the authorized service. If you have any questions regarding your coverage, please contact your insurance directly.    REFERRAL INFORMATION   Referral #:  [id number]  Referred-To Provider    Referred-By Provider:  Gastroenterology    ANISH Willis   [facility]      [address]  [address]  [facility] [id number] [phone] [address] [address]  [phone]    Referral Start Date:  03/08/2025  Referral End Date:   03/08/2026             SCHEDULING  If you do not already have an appointment, please call [phone] to make an appointment.     MORE INFORMATION  If you do not already have a [employer] account, sign up at: [email or web address]  You can access your medical information, make appointments, see lab results, billing information, and more.  If you have questions regarding this referral, please contact  the [facility] Referrals department at:             [phone]. Monday - Friday 8:00AM - 5:00PM.     Sincerely,    [facility]

## 2025-03-26 ENCOUNTER — RESULTS FOLLOW-UP (OUTPATIENT)
Dept: MEDICAL GROUP | Facility: MEDICAL CENTER | Age: 50
End: 2025-03-26

## 2025-04-02 NOTE — TELEPHONE ENCOUNTER
Prescriptions Prior to Admission[1]    Review of patient's allergies indicates:  No Known Allergies    Past Medical History:   Diagnosis Date    Arnold-Chiari malformation, type I     Prematurity     28 wk/twin    Seizures      Past Surgical History:   Procedure Laterality Date    ANGIOGRAM, CORONARY, PEDIATRIC  1/3/2024    Procedure: Angiogram, Coronary, Pediatric;  Surgeon: Dony Cunningham Jr., MD;  Location: [facility] CATH LAB;  Service: Pediatric Cardiology;;    CLOSURE, PFO, PEDIATRIC N/A 1/3/2024    Procedure: Closure, PFO, Pediatric;  Surgeon: Dony Cunningham Jr., MD;  Location: [facility] CATH LAB;  Service: Pediatric Cardiology;  Laterality: N/A;    COMBINED RIGHT AND RETROGRADE LEFT HEART CATHETERIZATION FOR CONGENITAL HEART DEFECT N/A 1/3/2024    Procedure: Catheterization, Heart, Combined Right and Retrograde Left, for Congenital Heart Defect;  Surgeon: Dony Cunningham Jr., MD;  Location: [facility] CATH LAB;  Service: Pediatric Cardiology;  Laterality: N/A;    ECHOCARDIOGRAM,TRANSESOPHAGEAL  1/3/2024    Procedure: Transesophageal echo (GAMA) intra-procedure log documentation;  Surgeon: Dony Cunningham Jr., MD;  Location: [facility] CATH LAB;  Service: Pediatric Cardiology;;    ENDOSCOPIC VENTRICULOSTOMY Right 5/7/2020    Procedure: VENTRICULOSTOMY, ENDOSCOPIC;  Surgeon: Arun Taylor MD;  Location: [address];  Service: Neurosurgery;  Laterality: Right;  regular bed, [place], supine, toronto I, asa 1, stealth     ESOPHAGOGASTRODUODENOSCOPY N/A 9/27/2019    Procedure: ESOPHAGOGASTRODUODENOSCOPY (EGD);  Surgeon: Tyson Gant MD;  Location: [facility] ENDO ([address] [place] [address]);  Service: Endoscopy;  Laterality: N/A;    ETV      EYE SURGERY      9 months old. both eyes    REVISION OF VENTRICULOPERITONEAL SHUNT Right 2/5/2021    Procedure: REVISION, SHUNT, VENTRICULOPERITONEAL;  Surgeon: Rita Franklin MD;  Location: [facility] OR [address] [place] [address];  Service: Neurosurgery;  Laterality: Right;     Family History       Problem Relation (Age of Onset)  Received request via: Pharmacy    Was the patient seen in the last year in this department? Yes    Does the patient have an active prescription (recently filled or refills available) for medication(s) requested? No     "   Diabetes Father    Hypertension Mother, Father    Migraines Mother    No Known Problems Sister, Sister    Pacemaker/defibrilator Paternal Grandfather          Tobacco Use    Smoking status: Never     Passive exposure: Never    Smokeless tobacco: Never   Substance and Sexual Activity    Alcohol use: No    Drug use: No    Sexual activity: Never     Review of Systems  Objective:     Weight: 53.1 kg (117 lb)  Body mass index is 22.85 kg/m².  Vital Signs (Most Recent):  Temp: 98 °F (36.7 °C) (04/02/25 0203)  Pulse: 65 (04/02/25 0203)  Resp: 16 (04/02/25 0203)  BP: 119/81 (04/02/25 0203)  SpO2: 100 % (04/02/25 0203) Vital Signs (24h Range):  Temp:  [98 °F (36.7 °C)-98.1 °F (36.7 °C)] 98 °F (36.7 °C)  Pulse:  [65-86] 65  Resp:  [16] 16  SpO2:  [100 %] 100 %  BP: (119-126)/(81-82) 119/81                                 Physical Exam         Neurosurgery Physical Exam  Neurosurgery Physical Exam    General: well developed, well nourished, no distress.   HEENT: normocephalic, atraumatic  CV: regular rate   Pulmonary: normal respirations, no signs of respiratory distress  Abdomen: soft, non-distended, not tender to palpation  Skin: Skin is warm, dry and intact  Heme: no bruising    Neuro:   Mental Status: AO x4, no aphasia, no dysarthria   CN: PERRL, EOMI, VF intact to confrontation, sensation intact bilaterally, eyebrow raise and grimace symmetric, tongue midline  Motor: moves all extremities spontaneously, full strength throughout, no pronator drift   Sensory: intact to light touch throughout  Cerebellar: finger to nose intact bilaterally  Reflexes: -Jules's, -Babinski, no clonus. Patellar: 2+ bilaterally     Valve pump/refills briskly  Significant Labs:  No results for input(s): "GLU", "NA", "K", "CL", "CO2", "BUN", "CREATININE", "CALCIUM", "MG" in the last 48 hours.  Recent Labs   Lab 04/01/25  2336   WBC 9.48   HGB 16.1*   HCT 49.8*        No results for input(s): "LABPT", "INR", "APTT" in the last 48 " hours.  Microbiology Results (last 7 days)       ** No results found for the last 168 hours. **          All pertinent labs from the last 24 hours have been reviewed.    Significant Diagnostics:  CT: CT Head Without Contrast  Result Date: 4/2/2025  Right parietal ventriculostomy shunt catheter is in unchanged position.  Ventricles are unchanged in size without evidence of hydrocephalus. Recurrent prominence of the dural venous sinuses, increased from 12/16/2024 but similar to 04/23/2024.  This could be related to intracranial hypotension.  Extensive dural venous sinus thrombosis could also potentially cause this appearance but is considered less likely.  Correlate clinically, and with follow-up imaging if clinically warranted. Otherwise, CT demonstrates no evidence of acute intracranial process. This report was flagged in Epic as abnormal. Electronically signed by resident: Oscar Narayan Date:    04/02/2025 Time:    01:45 Electronically signed by: Chaim Jewell Date:    04/02/2025 Time:    03:24    I have reviewed all pertinent imaging results/findings within the past 24 hours.       [1] (Not in a hospital admission)

## 2025-06-07 DIAGNOSIS — E11.69 DYSLIPIDEMIA ASSOCIATED WITH TYPE 2 DIABETES MELLITUS (HCC): ICD-10-CM

## 2025-06-07 DIAGNOSIS — I10 ESSENTIAL HYPERTENSION: ICD-10-CM

## 2025-06-07 DIAGNOSIS — E78.5 DYSLIPIDEMIA ASSOCIATED WITH TYPE 2 DIABETES MELLITUS (HCC): ICD-10-CM

## 2025-06-11 RX ORDER — AMLODIPINE BESYLATE 10 MG/1
10 TABLET ORAL DAILY
Qty: 90 TABLET | Refills: 3 | Status: SHIPPED | OUTPATIENT
Start: 2025-06-11

## 2025-06-11 RX ORDER — ROSUVASTATIN CALCIUM 10 MG/1
10 TABLET, COATED ORAL EVERY EVENING
Qty: 90 TABLET | Refills: 3 | Status: SHIPPED | OUTPATIENT
Start: 2025-06-11

## 2025-06-11 RX ORDER — LOSARTAN POTASSIUM 50 MG/1
50 TABLET ORAL DAILY
Qty: 90 TABLET | Refills: 3 | Status: SHIPPED | OUTPATIENT
Start: 2025-06-11

## 2025-06-20 ENCOUNTER — HOSPITAL ENCOUNTER (OUTPATIENT)
Dept: LAB | Facility: MEDICAL CENTER | Age: 50
End: 2025-06-20
Attending: FAMILY MEDICINE
Payer: COMMERCIAL

## 2025-06-20 ENCOUNTER — APPOINTMENT (OUTPATIENT)
Dept: LAB | Facility: MEDICAL CENTER | Age: 50
End: 2025-06-20
Payer: COMMERCIAL

## 2025-06-20 DIAGNOSIS — E11.69 DYSLIPIDEMIA ASSOCIATED WITH TYPE 2 DIABETES MELLITUS (HCC): ICD-10-CM

## 2025-06-20 DIAGNOSIS — E11.69 TYPE 2 DIABETES MELLITUS WITH OTHER SPECIFIED COMPLICATION, WITHOUT LONG-TERM CURRENT USE OF INSULIN (HCC): ICD-10-CM

## 2025-06-20 DIAGNOSIS — E78.5 DYSLIPIDEMIA ASSOCIATED WITH TYPE 2 DIABETES MELLITUS (HCC): ICD-10-CM

## 2025-06-20 LAB
ALBUMIN SERPL BCP-MCNC: 4.3 G/DL (ref 3.2–4.9)
ALBUMIN/GLOB SERPL: 1.5 G/DL
ALP SERPL-CCNC: 67 U/L (ref 30–99)
ALT SERPL-CCNC: 38 U/L (ref 2–50)
ANION GAP SERPL CALC-SCNC: 10 MMOL/L (ref 7–16)
AST SERPL-CCNC: 25 U/L (ref 12–45)
BILIRUB SERPL-MCNC: 0.4 MG/DL (ref 0.1–1.5)
BUN SERPL-MCNC: 10 MG/DL (ref 8–22)
CALCIUM ALBUM COR SERPL-MCNC: 9 MG/DL (ref 8.5–10.5)
CALCIUM SERPL-MCNC: 9.2 MG/DL (ref 8.5–10.5)
CHLORIDE SERPL-SCNC: 105 MMOL/L (ref 96–112)
CHOLEST SERPL-MCNC: 109 MG/DL (ref 100–199)
CO2 SERPL-SCNC: 25 MMOL/L (ref 20–33)
CREAT SERPL-MCNC: 0.68 MG/DL (ref 0.5–1.4)
EST. AVERAGE GLUCOSE BLD GHB EST-MCNC: 123 MG/DL
FASTING STATUS PATIENT QL REPORTED: NORMAL
GFR SERPLBLD CREATININE-BSD FMLA CKD-EPI: 107 ML/MIN/1.73 M 2
GLOBULIN SER CALC-MCNC: 2.9 G/DL (ref 1.9–3.5)
GLUCOSE SERPL-MCNC: 116 MG/DL (ref 65–99)
HBA1C MFR BLD: 5.9 % (ref 4–5.6)
HDLC SERPL-MCNC: 43 MG/DL
LDLC SERPL CALC-MCNC: 48 MG/DL
POTASSIUM SERPL-SCNC: 4.1 MMOL/L (ref 3.6–5.5)
PROT SERPL-MCNC: 7.2 G/DL (ref 6–8.2)
SODIUM SERPL-SCNC: 140 MMOL/L (ref 135–145)
TRIGL SERPL-MCNC: 90 MG/DL (ref 0–149)
VIT B12 SERPL-MCNC: 858 PG/ML (ref 211–911)

## 2025-06-20 PROCEDURE — 80053 COMPREHEN METABOLIC PANEL: CPT

## 2025-06-20 PROCEDURE — 82607 VITAMIN B-12: CPT

## 2025-06-20 PROCEDURE — 80061 LIPID PANEL: CPT

## 2025-06-20 PROCEDURE — 36415 COLL VENOUS BLD VENIPUNCTURE: CPT

## 2025-06-20 PROCEDURE — 83036 HEMOGLOBIN GLYCOSYLATED A1C: CPT

## 2025-06-23 ENCOUNTER — RESULTS FOLLOW-UP (OUTPATIENT)
Dept: MEDICAL GROUP | Facility: MEDICAL CENTER | Age: 50
End: 2025-06-23

## 2025-06-27 ENCOUNTER — APPOINTMENT (OUTPATIENT)
Dept: MEDICAL GROUP | Facility: MEDICAL CENTER | Age: 50
End: 2025-06-27
Payer: COMMERCIAL

## 2025-07-12 ENCOUNTER — OFFICE VISIT (OUTPATIENT)
Dept: URGENT CARE | Facility: PHYSICIAN GROUP | Age: 50
End: 2025-07-12
Payer: COMMERCIAL

## 2025-07-12 VITALS
DIASTOLIC BLOOD PRESSURE: 82 MMHG | RESPIRATION RATE: 16 BRPM | HEART RATE: 70 BPM | TEMPERATURE: 96.6 F | BODY MASS INDEX: 33.59 KG/M2 | OXYGEN SATURATION: 99 % | WEIGHT: 189.6 LBS | SYSTOLIC BLOOD PRESSURE: 122 MMHG | HEIGHT: 63 IN

## 2025-07-12 DIAGNOSIS — L20.89 FLEXURAL ATOPIC DERMATITIS: Primary | ICD-10-CM

## 2025-07-12 PROCEDURE — 3074F SYST BP LT 130 MM HG: CPT

## 2025-07-12 PROCEDURE — 3079F DIAST BP 80-89 MM HG: CPT

## 2025-07-12 PROCEDURE — 99213 OFFICE O/P EST LOW 20 MIN: CPT

## 2025-07-12 RX ORDER — CLOBETASOL PROPIONATE 0.5 MG/G
1 OINTMENT TOPICAL 2 TIMES DAILY
Qty: 60 G | Refills: 0 | Status: SHIPPED | OUTPATIENT
Start: 2025-07-12

## 2025-07-12 RX ORDER — CLOBETASOL PROPIONATE 0.5 MG/G
OINTMENT TOPICAL 2 TIMES DAILY
COMMUNITY
End: 2025-07-12 | Stop reason: SDUPTHER

## 2025-07-12 ASSESSMENT — ENCOUNTER SYMPTOMS
SHORTNESS OF BREATH: 0
VOMITING: 0
COUGH: 0
DIARRHEA: 0
FEVER: 0
DIZZINESS: 0
NAUSEA: 0
WEAKNESS: 0

## 2025-07-12 ASSESSMENT — FIBROSIS 4 INDEX: FIB4 SCORE: 0.84

## 2025-07-12 NOTE — PROGRESS NOTES
"Subjective     Kacey No is a 49 y.o. female who presents with Allergic Reaction (On legs, rash, on left leg, around knee, on and off but started about two months ago, had ointment but ran out, very itchy)            Kacey is here today with complaints of a recurrent rash on her right knee that she has had previously and used clobetasol ointment which has greatly helped however she is needing refill at this time.  She notes that she has a referral to dermatology but has not seen them and needs their phone number again.        Review of Systems   Constitutional:  Negative for fever and malaise/fatigue.   HENT:  Negative for congestion.    Respiratory:  Negative for cough and shortness of breath.    Cardiovascular:  Negative for chest pain.   Gastrointestinal:  Negative for diarrhea, nausea and vomiting.   Skin:  Positive for rash.   Neurological:  Negative for dizziness and weakness.   All other systems reviewed and are negative.             Objective     /82 (BP Location: Right arm, Patient Position: Sitting, BP Cuff Size: Adult long)   Pulse 70   Temp 35.9 °C (96.6 °F) (Temporal)   Resp 16   Ht 1.6 m (5' 3\")   Wt 86 kg (189 lb 9.5 oz)   SpO2 99%   BMI 33.59 kg/m²      Physical Exam  Vitals reviewed.   Constitutional:       Appearance: Normal appearance.   HENT:      Head: Normocephalic.      Nose: Nose normal.   Eyes:      Extraocular Movements: Extraocular movements intact.      Conjunctiva/sclera: Conjunctivae normal.   Cardiovascular:      Rate and Rhythm: Normal rate.   Pulmonary:      Effort: Pulmonary effort is normal.   Musculoskeletal:         General: Normal range of motion.   Skin:     General: Skin is warm and dry.      Findings: Erythema and rash present. Rash is scaling.          Neurological:      Mental Status: She is alert and oriented to person, place, and time.   Psychiatric:         Behavior: Behavior normal.                                  Assessment & Plan  Flexural " atopic dermatitis    Orders:    clobetasol (TEMOVATE) 0.05 % Ointment; Apply 1 Application topically 2 times a day.    Given dermatology referral information and phone number from October.    Given educational information on eczema.     Shared decision-making was utilized with Kacey for plan of care. Discussed differential diagnoses, natural history, and supportive care. Reviewed indication for use and the potential benefits and side effects of medications. Kacey was encouraged to monitor symptoms closely and educated about signs and symptoms that would warrant concern and mandate seeking a higher level of service through the emergency department discussed at length. All questions answered to Kacey's satisfaction and they were in agreement with treatment plan at time of discharge.

## 2025-07-18 ENCOUNTER — APPOINTMENT (OUTPATIENT)
Dept: MEDICAL GROUP | Facility: MEDICAL CENTER | Age: 50
End: 2025-07-18
Payer: COMMERCIAL

## 2025-07-18 ENCOUNTER — HOSPITAL ENCOUNTER (OUTPATIENT)
Facility: MEDICAL CENTER | Age: 50
End: 2025-07-18
Attending: FAMILY MEDICINE
Payer: COMMERCIAL

## 2025-07-18 VITALS
BODY MASS INDEX: 32.81 KG/M2 | HEART RATE: 71 BPM | TEMPERATURE: 97.8 F | SYSTOLIC BLOOD PRESSURE: 138 MMHG | HEIGHT: 63 IN | OXYGEN SATURATION: 96 % | WEIGHT: 185.19 LBS | DIASTOLIC BLOOD PRESSURE: 88 MMHG

## 2025-07-18 DIAGNOSIS — E11.69 DYSLIPIDEMIA ASSOCIATED WITH TYPE 2 DIABETES MELLITUS (HCC): ICD-10-CM

## 2025-07-18 DIAGNOSIS — E78.5 DYSLIPIDEMIA ASSOCIATED WITH TYPE 2 DIABETES MELLITUS (HCC): ICD-10-CM

## 2025-07-18 DIAGNOSIS — E11.69 TYPE 2 DIABETES MELLITUS WITH OTHER SPECIFIED COMPLICATION, WITHOUT LONG-TERM CURRENT USE OF INSULIN (HCC): Primary | ICD-10-CM

## 2025-07-18 DIAGNOSIS — N89.8 VAGINAL ITCHING: ICD-10-CM

## 2025-07-18 DIAGNOSIS — I10 ESSENTIAL HYPERTENSION: ICD-10-CM

## 2025-07-18 PROCEDURE — 87510 GARDNER VAG DNA DIR PROBE: CPT

## 2025-07-18 PROCEDURE — 3075F SYST BP GE 130 - 139MM HG: CPT | Performed by: FAMILY MEDICINE

## 2025-07-18 PROCEDURE — 87660 TRICHOMONAS VAGIN DIR PROBE: CPT

## 2025-07-18 PROCEDURE — 99214 OFFICE O/P EST MOD 30 MIN: CPT | Performed by: FAMILY MEDICINE

## 2025-07-18 PROCEDURE — 3079F DIAST BP 80-89 MM HG: CPT | Performed by: FAMILY MEDICINE

## 2025-07-18 PROCEDURE — 87480 CANDIDA DNA DIR PROBE: CPT

## 2025-07-18 ASSESSMENT — ENCOUNTER SYMPTOMS
CHILLS: 0
FEVER: 0

## 2025-07-18 ASSESSMENT — FIBROSIS 4 INDEX: FIB4 SCORE: 0.84

## 2025-07-18 NOTE — PROGRESS NOTES
Verbal consent was acquired by the patient to use [employer] ambient listening note generation during this visit.    Patient speaks tagalog, used  ID [id number] Rafiq Erazo was seen today for follow-up.    Diagnoses and all orders for this visit:    Type 2 diabetes mellitus with other specified complication, without long-term current use of insulin (HCC)  -     Comp Metabolic Panel; Future  -     HEMOGLOBIN A1C; Future    Dyslipidemia associated with type 2 diabetes mellitus (HCC)  -     Lipid Profile; Future  -     TSH; Future  -     FREE THYROXINE; Future    Essential hypertension    Vaginal itching  -     VAGINAL PATHOGENS DNA PANEL; Future               Assessment & Plan  1. Vaginal itching:  New condition, unstable  - Slight itchiness persists, but significantly improved after using an ointment prescribed for an allergy.  - No current severe itchiness reported.  - A swab will be provided for her to self-administer from the vaginal area to rule out any infection.  - Swab will be sent to a lab for analysis.    2.  Hypertension  Chronic condition, borderline control today as patient has not taken her medications  Continue amlodipine 10 mg and losartan 50 mg daily    3.  Type 2 diabetes mellitus  Chronic condition, improving, continue metformin 5 mg twice daily.  Recheck labs in 6 months  - Blood sugar improved from 6.5 to 5.9.    4.  Dyslipidemia  Chronic condition, improving and came back normal.  Continue rosuvastatin 10 mg daily    Follow-up:  - Patient will follow up in 6 months for lab follow-up.          Chief complaint::The primary encounter diagnosis was Type 2 diabetes mellitus with other specified complication, without long-term current use of insulin (HCC). Diagnoses of Dyslipidemia associated with type 2 diabetes mellitus (HCC), Essential hypertension, and Vaginal itching were also pertinent to this visit.      History of Present Illness  The patient is a 49-year-old female who presents  "for a blood test follow-up. She is accompanied by an .    Vaginal Itching  - She has been experiencing vaginal itching for the past 2 weeks.  - Last week, she sought help from an urgent care facility due to an allergic reaction on her knees.  - An ointment was prescribed for this condition, which she also applied to her vaginal area.  - This treatment alleviated the itchiness, although she still experiences slight discomfort.  - The rash on her leg has also improved.    Elevated Blood Pressure  - Her blood pressure is slightly elevated today.  - She is currently on losartan and amlodipine for blood pressure management but did not take them this morning.    Sleep  - She reports not having slept yet.      Review of Systems   Constitutional:  Negative for chills and fever.   Genitourinary:         Vaginal itching          Medications and Allergies:       Current Outpatient Medications:     clobetasol, 1 Application, Topical, BID, Taking    amLODIPine, 10 mg, Oral, DAILY, Taking    losartan, 50 mg, Oral, DAILY, Taking    rosuvastatin, 10 mg, Oral, Q EVENING, Taking    metFORMIN, 500 mg, Oral, BID WITH MEALS, Taking     BP (!) 144/94 (BP Location: Left arm, Patient Position: Sitting, BP Cuff Size: Adult)   Pulse 71   Temp 36.6 °C (97.8 °F) (Temporal)   Ht 1.588 m (5' 2.5\")   Wt 84 kg (185 lb 3 oz)   SpO2 96% , Body mass index is 33.33 kg/m².      Physical Exam  Constitutional:       Appearance: Normal appearance. She is well-developed and well-groomed.   HENT:      Head: Normocephalic and atraumatic.      Right Ear: External ear normal.      Left Ear: External ear normal.   Eyes:      General:         Right eye: No discharge.         Left eye: No discharge.      Conjunctiva/sclera: Conjunctivae normal.   Cardiovascular:      Rate and Rhythm: Normal rate.   Pulmonary:      Effort: Pulmonary effort is normal. No respiratory distress.   Musculoskeletal:      Cervical back: Neck supple.   Skin:     Findings: " No rash.   Neurological:      Mental Status: She is alert.   Psychiatric:         Mood and Affect: Mood and affect normal.         Behavior: Behavior normal.            I reviewed with patient lab results resulted on 6/20/2025.        Please note that this dictation was created using voice recognition software. I have made every reasonable attempt to correct obvious errors, but I expect that there are errors of grammar and possibly content that I did not discover before finalizing the note.

## 2025-07-19 LAB
CANDIDA DNA VAG QL PROBE+SIG AMP: NEGATIVE
G VAGINALIS DNA VAG QL PROBE+SIG AMP: NEGATIVE
T VAGINALIS DNA VAG QL PROBE+SIG AMP: NEGATIVE

## 2025-08-21 DIAGNOSIS — E11.69 TYPE 2 DIABETES MELLITUS WITH OTHER SPECIFIED COMPLICATION, WITHOUT LONG-TERM CURRENT USE OF INSULIN (HCC): ICD-10-CM

## 2025-08-21 DIAGNOSIS — E11.69 DYSLIPIDEMIA ASSOCIATED WITH TYPE 2 DIABETES MELLITUS (HCC): ICD-10-CM

## 2025-08-21 DIAGNOSIS — E78.5 DYSLIPIDEMIA ASSOCIATED WITH TYPE 2 DIABETES MELLITUS (HCC): ICD-10-CM

## 2025-08-21 DIAGNOSIS — I10 ESSENTIAL HYPERTENSION: ICD-10-CM

## 2025-08-22 RX ORDER — AMLODIPINE BESYLATE 10 MG/1
10 TABLET ORAL DAILY
Qty: 90 TABLET | Refills: 3 | Status: SHIPPED | OUTPATIENT
Start: 2025-08-22

## 2025-08-22 RX ORDER — ROSUVASTATIN CALCIUM 10 MG/1
10 TABLET, COATED ORAL EVERY EVENING
Qty: 90 TABLET | Refills: 3 | Status: SHIPPED | OUTPATIENT
Start: 2025-08-22

## 2025-08-22 RX ORDER — LOSARTAN POTASSIUM 50 MG/1
50 TABLET ORAL DAILY
Qty: 90 TABLET | Refills: 3 | Status: SHIPPED | OUTPATIENT
Start: 2025-08-22

## (undated) DEVICE — ELECTRODE 850 FOAM ADHESIVE - HYDROGEL RADIOTRNSPRNT (50/PK)

## (undated) DEVICE — SPONGE GAUZESTER 4 X 4 4PLY - (128PK/CA)

## (undated) DEVICE — PROTECTOR ULNA NERVE - (36PR/CA)

## (undated) DEVICE — SUTURE 3-0 VICRYL PLUS SH - 8X 18 INCH (12/BX)

## (undated) DEVICE — NEPTUNE 4 PORT MANIFOLD - (20/PK)

## (undated) DEVICE — CANISTER SUCTION 3000ML MECHANICAL FILTER AUTO SHUTOFF MEDI-VAC NONSTERILE LF DISP  (40EA/CA)

## (undated) DEVICE — SUCTION INSTRUMENT YANKAUER BULBOUS TIP W/O VENT (50EA/CA)

## (undated) DEVICE — STAPLER SKIN DISP - (6/BX 10BX/CA) VISISTAT

## (undated) DEVICE — GOWN WARMING STANDARD FLEX - (30/CA)

## (undated) DEVICE — DRAPE LAPAROTOMY T SHEET - (12EA/CA)

## (undated) DEVICE — SENSOR SPO2 NEO LNCS ADHESIVE (20/BX) SEE USER NOTES

## (undated) DEVICE — GLOVE BIOGEL SZ 8 SURGICAL PF LTX - (50PR/BX 4BX/CA)

## (undated) DEVICE — SODIUM CHL IRRIGATION 0.9% 1000ML (12EA/CA)

## (undated) DEVICE — SET LEADWIRE 5 LEAD BEDSIDE DISPOSABLE ECG (1SET OF 5/EA)

## (undated) DEVICE — SLEEVE, VASO, THIGH, MED

## (undated) DEVICE — GOWN SURGEONS LARGE - (32/CA)

## (undated) DEVICE — CHLORAPREP 26 ML APPLICATOR - ORANGE TINT(25/CA)

## (undated) DEVICE — HEAD HOLDER JUNIOR/ADULT

## (undated) DEVICE — LACTATED RINGERS INJ 1000 ML - (14EA/CA 60CA/PF)

## (undated) DEVICE — GLOVE SZ 6 BIOGEL PI MICRO - PF LF (50PR/BX 4BX/CA)

## (undated) DEVICE — ELECTRODE DUAL RETURN W/ CORD - (50/PK)

## (undated) DEVICE — MASK ANESTHESIA ADULT  - (100/CA)

## (undated) DEVICE — KIT ANESTHESIA W/CIRCUIT & 3/LT BAG W/FILTER (20EA/CA)

## (undated) DEVICE — GLOVE BIOGEL PI INDICATOR SZ 6.5 SURGICAL PF LF - (50/BX 4BX/CA)

## (undated) DEVICE — SET EXTENSION WITH 2 PORTS (48EA/CA) ***PART #2C8610 IS A SUBSTITUTE*****

## (undated) DEVICE — SUTURE GENERAL

## (undated) DEVICE — SYRINGE 10 ML CONTROL LL (25EA/BX 4BX/CA)

## (undated) DEVICE — PACK MINOR BASIN - (2EA/CA)

## (undated) DEVICE — BLADE SURGICAL #15 - (50/BX 3BX/CA)

## (undated) DEVICE — TUBING CLEARLINK DUO-VENT - C-FLO (48EA/CA)